# Patient Record
Sex: MALE | Race: WHITE | Employment: UNEMPLOYED | URBAN - METROPOLITAN AREA
[De-identification: names, ages, dates, MRNs, and addresses within clinical notes are randomized per-mention and may not be internally consistent; named-entity substitution may affect disease eponyms.]

---

## 2024-04-03 ENCOUNTER — HOSPITAL ENCOUNTER (INPATIENT)
Facility: HOSPITAL | Age: 29
LOS: 3 days | Discharge: HOME | End: 2024-04-06
Attending: INTERNAL MEDICINE | Admitting: INTERNAL MEDICINE
Payer: MEDICAID

## 2024-04-03 ENCOUNTER — APPOINTMENT (OUTPATIENT)
Facility: HOSPITAL | Age: 29
End: 2024-04-03
Payer: MEDICAID

## 2024-04-03 DIAGNOSIS — J18.9 COMMUNITY ACQUIRED PNEUMONIA OF RIGHT LOWER LOBE OF LUNG: Primary | ICD-10-CM

## 2024-04-03 DIAGNOSIS — R09.02 HYPOXEMIA: ICD-10-CM

## 2024-04-03 DIAGNOSIS — Z86.59 HISTORY OF AUTISM: ICD-10-CM

## 2024-04-03 LAB
ALBUMIN SERPL-MCNC: 3.1 G/DL (ref 3.4–5)
ALBUMIN/GLOB SERPL: 0.8 (ref 0.8–1.7)
ALP SERPL-CCNC: 86 U/L (ref 45–117)
ALT SERPL-CCNC: 64 U/L (ref 16–61)
ANION GAP SERPL CALC-SCNC: 6 MMOL/L (ref 3–18)
AST SERPL-CCNC: 57 U/L (ref 10–38)
BASOPHILS # BLD: 0 K/UL (ref 0–0.1)
BASOPHILS NFR BLD: 0 % (ref 0–2)
BILIRUB SERPL-MCNC: 0.6 MG/DL (ref 0.2–1)
BUN SERPL-MCNC: 11 MG/DL (ref 7–18)
BUN/CREAT SERPL: 11 (ref 12–20)
CALCIUM SERPL-MCNC: 8 MG/DL (ref 8.5–10.1)
CHLORIDE SERPL-SCNC: 108 MMOL/L (ref 100–111)
CO2 SERPL-SCNC: 26 MMOL/L (ref 21–32)
CREAT SERPL-MCNC: 1.01 MG/DL (ref 0.6–1.3)
DIFFERENTIAL METHOD BLD: ABNORMAL
EOSINOPHIL # BLD: 0 K/UL (ref 0–0.4)
EOSINOPHIL NFR BLD: 0 % (ref 0–5)
ERYTHROCYTE [DISTWIDTH] IN BLOOD BY AUTOMATED COUNT: 14.6 % (ref 11.6–14.5)
FLUAV RNA SPEC QL NAA+PROBE: NOT DETECTED
FLUBV RNA SPEC QL NAA+PROBE: NOT DETECTED
GLOBULIN SER CALC-MCNC: 4 G/DL (ref 2–4)
GLUCOSE SERPL-MCNC: 93 MG/DL (ref 74–99)
HCT VFR BLD AUTO: 43 % (ref 36–48)
HGB BLD-MCNC: 14.7 G/DL (ref 13–16)
IMM GRANULOCYTES # BLD AUTO: 0 K/UL (ref 0–0.04)
IMM GRANULOCYTES NFR BLD AUTO: 0 % (ref 0–0.5)
LACTATE SERPL-SCNC: 1 MMOL/L (ref 0.4–2)
LYMPHOCYTES # BLD: 1.8 K/UL (ref 0.9–3.6)
LYMPHOCYTES NFR BLD: 18 % (ref 21–52)
MCH RBC QN AUTO: 33.6 PG (ref 24–34)
MCHC RBC AUTO-ENTMCNC: 34.2 G/DL (ref 31–37)
MCV RBC AUTO: 98.4 FL (ref 78–100)
MONOCYTES # BLD: 0.8 K/UL (ref 0.05–1.2)
MONOCYTES NFR BLD: 8 % (ref 3–10)
NEUTS SEG # BLD: 7.7 K/UL (ref 1.8–8)
NEUTS SEG NFR BLD: 74 % (ref 40–73)
NRBC # BLD: 0 K/UL (ref 0–0.01)
NRBC BLD-RTO: 0 PER 100 WBC
PLATELET # BLD AUTO: 224 K/UL (ref 135–420)
PMV BLD AUTO: 10.2 FL (ref 9.2–11.8)
POTASSIUM SERPL-SCNC: 4.2 MMOL/L (ref 3.5–5.5)
PROT SERPL-MCNC: 7.1 G/DL (ref 6.4–8.2)
RBC # BLD AUTO: 4.37 M/UL (ref 4.35–5.65)
SARS-COV-2 RNA RESP QL NAA+PROBE: NOT DETECTED
SODIUM SERPL-SCNC: 140 MMOL/L (ref 136–145)
WBC # BLD AUTO: 10.4 K/UL (ref 4.6–13.2)

## 2024-04-03 PROCEDURE — 1100000000 HC RM PRIVATE

## 2024-04-03 PROCEDURE — 71045 X-RAY EXAM CHEST 1 VIEW: CPT

## 2024-04-03 PROCEDURE — 87040 BLOOD CULTURE FOR BACTERIA: CPT

## 2024-04-03 PROCEDURE — 2580000003 HC RX 258: Performed by: INTERNAL MEDICINE

## 2024-04-03 PROCEDURE — 6360000002 HC RX W HCPCS: Performed by: INTERNAL MEDICINE

## 2024-04-03 PROCEDURE — 99285 EMERGENCY DEPT VISIT HI MDM: CPT

## 2024-04-03 PROCEDURE — 83605 ASSAY OF LACTIC ACID: CPT

## 2024-04-03 PROCEDURE — 85025 COMPLETE CBC W/AUTO DIFF WBC: CPT

## 2024-04-03 PROCEDURE — 2580000003 HC RX 258: Performed by: PHYSICIAN ASSISTANT

## 2024-04-03 PROCEDURE — 6360000002 HC RX W HCPCS: Performed by: PHYSICIAN ASSISTANT

## 2024-04-03 PROCEDURE — 96365 THER/PROPH/DIAG IV INF INIT: CPT

## 2024-04-03 PROCEDURE — 6370000000 HC RX 637 (ALT 250 FOR IP): Performed by: PHYSICIAN ASSISTANT

## 2024-04-03 PROCEDURE — 80053 COMPREHEN METABOLIC PANEL: CPT

## 2024-04-03 PROCEDURE — 87636 SARSCOV2 & INF A&B AMP PRB: CPT

## 2024-04-03 PROCEDURE — 96375 TX/PRO/DX INJ NEW DRUG ADDON: CPT

## 2024-04-03 RX ORDER — SODIUM CHLORIDE 0.9 % (FLUSH) 0.9 %
5-40 SYRINGE (ML) INJECTION EVERY 12 HOURS SCHEDULED
Status: DISCONTINUED | OUTPATIENT
Start: 2024-04-03 | End: 2024-04-06 | Stop reason: HOSPADM

## 2024-04-03 RX ORDER — ENOXAPARIN SODIUM 100 MG/ML
30 INJECTION SUBCUTANEOUS 2 TIMES DAILY
Status: DISCONTINUED | OUTPATIENT
Start: 2024-04-03 | End: 2024-04-05

## 2024-04-03 RX ORDER — ACETAMINOPHEN 650 MG/1
650 SUPPOSITORY RECTAL EVERY 6 HOURS PRN
Status: DISCONTINUED | OUTPATIENT
Start: 2024-04-03 | End: 2024-04-06 | Stop reason: HOSPADM

## 2024-04-03 RX ORDER — ALBUTEROL SULFATE 90 UG/1
2 AEROSOL, METERED RESPIRATORY (INHALATION) EVERY 6 HOURS PRN
Qty: 18 G | Refills: 0 | Status: SHIPPED | OUTPATIENT
Start: 2024-04-03

## 2024-04-03 RX ORDER — SODIUM CHLORIDE 9 MG/ML
INJECTION, SOLUTION INTRAVENOUS CONTINUOUS
Status: DISPENSED | OUTPATIENT
Start: 2024-04-03 | End: 2024-04-05

## 2024-04-03 RX ORDER — ONDANSETRON 4 MG/1
4 TABLET, ORALLY DISINTEGRATING ORAL EVERY 8 HOURS PRN
Status: DISCONTINUED | OUTPATIENT
Start: 2024-04-03 | End: 2024-04-06 | Stop reason: HOSPADM

## 2024-04-03 RX ORDER — SODIUM CHLORIDE 0.9 % (FLUSH) 0.9 %
5-40 SYRINGE (ML) INJECTION PRN
Status: DISCONTINUED | OUTPATIENT
Start: 2024-04-03 | End: 2024-04-06 | Stop reason: HOSPADM

## 2024-04-03 RX ORDER — ONDANSETRON 2 MG/ML
4 INJECTION INTRAMUSCULAR; INTRAVENOUS EVERY 6 HOURS PRN
Status: DISCONTINUED | OUTPATIENT
Start: 2024-04-03 | End: 2024-04-06 | Stop reason: HOSPADM

## 2024-04-03 RX ORDER — ACETAMINOPHEN 325 MG/1
650 TABLET ORAL EVERY 6 HOURS PRN
Status: DISCONTINUED | OUTPATIENT
Start: 2024-04-03 | End: 2024-04-06 | Stop reason: HOSPADM

## 2024-04-03 RX ORDER — SODIUM CHLORIDE, SODIUM LACTATE, POTASSIUM CHLORIDE, AND CALCIUM CHLORIDE .6; .31; .03; .02 G/100ML; G/100ML; G/100ML; G/100ML
1000 INJECTION, SOLUTION INTRAVENOUS
Status: COMPLETED | OUTPATIENT
Start: 2024-04-03 | End: 2024-04-03

## 2024-04-03 RX ORDER — LORAZEPAM 1 MG/1
2 TABLET ORAL
Status: COMPLETED | OUTPATIENT
Start: 2024-04-03 | End: 2024-04-03

## 2024-04-03 RX ORDER — POLYETHYLENE GLYCOL 3350 17 G/17G
17 POWDER, FOR SOLUTION ORAL DAILY PRN
Status: DISCONTINUED | OUTPATIENT
Start: 2024-04-03 | End: 2024-04-06 | Stop reason: HOSPADM

## 2024-04-03 RX ORDER — DEXAMETHASONE SODIUM PHOSPHATE 10 MG/ML
6 INJECTION, SOLUTION INTRAMUSCULAR; INTRAVENOUS
Status: COMPLETED | OUTPATIENT
Start: 2024-04-03 | End: 2024-04-03

## 2024-04-03 RX ORDER — INHALER, ASSIST DEVICES
SPACER (EA) MISCELLANEOUS
Qty: 1 EACH | Refills: 0 | Status: SHIPPED | OUTPATIENT
Start: 2024-04-03

## 2024-04-03 RX ORDER — AMOXICILLIN AND CLAVULANATE POTASSIUM 875; 125 MG/1; MG/1
1 TABLET, FILM COATED ORAL 2 TIMES DAILY
Qty: 20 TABLET | Refills: 0 | Status: SHIPPED | OUTPATIENT
Start: 2024-04-03 | End: 2024-04-13

## 2024-04-03 RX ORDER — SODIUM CHLORIDE 9 MG/ML
INJECTION, SOLUTION INTRAVENOUS PRN
Status: DISCONTINUED | OUTPATIENT
Start: 2024-04-03 | End: 2024-04-06 | Stop reason: HOSPADM

## 2024-04-03 RX ORDER — METHYLPREDNISOLONE 4 MG/1
TABLET ORAL
Qty: 1 KIT | Refills: 0 | Status: SHIPPED | OUTPATIENT
Start: 2024-04-03

## 2024-04-03 RX ORDER — BENZONATATE 100 MG/1
100 CAPSULE ORAL 3 TIMES DAILY PRN
Qty: 21 CAPSULE | Refills: 0 | Status: SHIPPED | OUTPATIENT
Start: 2024-04-03 | End: 2024-04-10

## 2024-04-03 RX ORDER — AZITHROMYCIN 250 MG/1
250 TABLET, FILM COATED ORAL DAILY
Qty: 4 TABLET | Refills: 0 | Status: SHIPPED | OUTPATIENT
Start: 2024-04-03 | End: 2024-04-07

## 2024-04-03 RX ADMIN — LORAZEPAM 2 MG: 1 TABLET ORAL at 20:07

## 2024-04-03 RX ADMIN — CEFTRIAXONE SODIUM 2000 MG: 2 INJECTION, POWDER, FOR SOLUTION INTRAMUSCULAR; INTRAVENOUS at 20:08

## 2024-04-03 RX ADMIN — SODIUM CHLORIDE, POTASSIUM CHLORIDE, SODIUM LACTATE AND CALCIUM CHLORIDE 1000 ML: 600; 310; 30; 20 INJECTION, SOLUTION INTRAVENOUS at 20:08

## 2024-04-03 RX ADMIN — DEXAMETHASONE SODIUM PHOSPHATE 6 MG: 10 INJECTION, SOLUTION INTRAMUSCULAR; INTRAVENOUS at 22:20

## 2024-04-03 RX ADMIN — ENOXAPARIN SODIUM 30 MG: 100 INJECTION SUBCUTANEOUS at 23:51

## 2024-04-03 RX ADMIN — SODIUM CHLORIDE, PRESERVATIVE FREE 10 ML: 5 INJECTION INTRAVENOUS at 23:57

## 2024-04-03 RX ADMIN — AZITHROMYCIN MONOHYDRATE 500 MG: 500 INJECTION, POWDER, LYOPHILIZED, FOR SOLUTION INTRAVENOUS at 21:09

## 2024-04-03 RX ADMIN — SODIUM CHLORIDE: 9 INJECTION, SOLUTION INTRAVENOUS at 23:52

## 2024-04-03 ASSESSMENT — PAIN - FUNCTIONAL ASSESSMENT: PAIN_FUNCTIONAL_ASSESSMENT: 0-10

## 2024-04-03 ASSESSMENT — LIFESTYLE VARIABLES
HOW OFTEN DO YOU HAVE A DRINK CONTAINING ALCOHOL: NEVER
HOW MANY STANDARD DRINKS CONTAINING ALCOHOL DO YOU HAVE ON A TYPICAL DAY: PATIENT DOES NOT DRINK

## 2024-04-03 ASSESSMENT — PAIN DESCRIPTION - LOCATION: LOCATION: HEAD

## 2024-04-03 NOTE — ED PROVIDER NOTES
[id number]  [phone]             DISCHARGE MEDICATIONS:     Medication List        START taking these medications      albuterol sulfate  (90 Base) MCG/ACT inhaler  Commonly known as: PROVENTIL;VENTOLIN;PROAIR  Inhale 2 puffs into the lungs every 6 hours as needed for Wheezing     amoxicillin-clavulanate 875-125 MG per tablet  Commonly known as: AUGMENTIN  Take 1 tablet by mouth 2 times daily for 10 days     azithromycin 250 MG tablet  Commonly known as: ZITHROMAX  Take 1 tablet by mouth daily for 4 days     benzonatate 100 MG capsule  Commonly known as: TESSALON  Take 1 capsule by mouth 3 times daily as needed for Cough     Compact Space Chamber Catherine  Please provide one adult spacer to be used with HFA     methylPREDNISolone 4 MG tablet  Commonly known as: MEDROL DOSEPACK  Take with food.               Where to Get Your Medications        [id number] [place], [place] - [address] - P [phone] - F [phone] [address] [address]      Phone: [phone]   albuterol sulfate  (90 Base) MCG/ACT inhaler  amoxicillin-clavulanate 875-125 MG per tablet  azithromycin 250 MG tablet  benzonatate 100 MG capsule  Compact Space Chamber Catherine  methylPREDNISolone 4 MG tablet            I am the Primary Clinician of Record.       (Please note that parts of this dictation were completed with voice recognition software. Quite often unanticipated grammatical, syntax, homophones, and other interpretive errors are inadvertently transcribed by the computer software. Please disregards these errors. Please excuse any errors that have escaped final proofreading.)       Anibal Tapia PA-C  04/03/24 [id number]

## 2024-04-04 ENCOUNTER — APPOINTMENT (OUTPATIENT)
Facility: HOSPITAL | Age: 29
End: 2024-04-04
Attending: INTERNAL MEDICINE
Payer: MEDICAID

## 2024-04-04 PROBLEM — F84.0 AUTISM: Status: ACTIVE | Noted: 2024-04-04

## 2024-04-04 PROBLEM — Z87.74: Status: ACTIVE | Noted: 2024-04-04

## 2024-04-04 PROBLEM — Q90.9 DOWN SYNDROME: Status: ACTIVE | Noted: 2024-04-04

## 2024-04-04 PROBLEM — G47.30 SLEEP APNEA: Status: ACTIVE | Noted: 2024-04-04

## 2024-04-04 PROBLEM — E03.9 HYPOTHYROIDISM: Status: ACTIVE | Noted: 2024-04-04

## 2024-04-04 LAB
ALBUMIN SERPL-MCNC: 3 G/DL (ref 3.4–5)
ALBUMIN/GLOB SERPL: 0.7 (ref 0.8–1.7)
ALP SERPL-CCNC: 92 U/L (ref 45–117)
ALT SERPL-CCNC: 67 U/L (ref 16–61)
ANION GAP SERPL CALC-SCNC: 3 MMOL/L (ref 3–18)
AST SERPL-CCNC: 53 U/L (ref 10–38)
BILIRUB SERPL-MCNC: 0.4 MG/DL (ref 0.2–1)
BUN SERPL-MCNC: 10 MG/DL (ref 7–18)
BUN/CREAT SERPL: 9 (ref 12–20)
CALCIUM SERPL-MCNC: 8.2 MG/DL (ref 8.5–10.1)
CHLORIDE SERPL-SCNC: 109 MMOL/L (ref 100–111)
CO2 SERPL-SCNC: 29 MMOL/L (ref 21–32)
CREAT SERPL-MCNC: 1.08 MG/DL (ref 0.6–1.3)
ECHO AO ASC DIAM: 3.5 CM
ECHO AO ASCENDING AORTA INDEX: 1.67 CM/M2
ECHO AO ROOT DIAM: 3.1 CM
ECHO AO ROOT INDEX: 1.48 CM/M2
ECHO AV AREA PEAK VELOCITY: 2.6 CM2
ECHO AV AREA VTI: 2.6 CM2
ECHO AV AREA/BSA PEAK VELOCITY: 1.2 CM2/M2
ECHO AV AREA/BSA VTI: 1.2 CM2/M2
ECHO AV MEAN GRADIENT: 5 MMHG
ECHO AV MEAN VELOCITY: 1 M/S
ECHO AV PEAK GRADIENT: 11 MMHG
ECHO AV PEAK VELOCITY: 1.6 M/S
ECHO AV VELOCITY RATIO: 0.81
ECHO AV VTI: 27 CM
ECHO BSA: 2.23 M2
ECHO EST RA PRESSURE: 3 MMHG
ECHO LA DIAMETER INDEX: 1.86 CM/M2
ECHO LA DIAMETER: 3.9 CM
ECHO LA TO AORTIC ROOT RATIO: 1.26
ECHO LA VOL A-L A2C: 40 ML (ref 18–58)
ECHO LA VOL A-L A4C: 32 ML (ref 18–58)
ECHO LA VOL BP: 35 ML (ref 18–58)
ECHO LA VOL MOD A2C: 39 ML (ref 18–58)
ECHO LA VOL MOD A4C: 30 ML (ref 18–58)
ECHO LA VOL/BSA BIPLANE: 17 ML/M2 (ref 16–34)
ECHO LA VOLUME AREA LENGTH: 37 ML
ECHO LA VOLUME INDEX A-L A2C: 19 ML/M2 (ref 16–34)
ECHO LA VOLUME INDEX A-L A4C: 15 ML/M2 (ref 16–34)
ECHO LA VOLUME INDEX AREA LENGTH: 18 ML/M2 (ref 16–34)
ECHO LA VOLUME INDEX MOD A2C: 19 ML/M2 (ref 16–34)
ECHO LA VOLUME INDEX MOD A4C: 14 ML/M2 (ref 16–34)
ECHO LV E' LATERAL VELOCITY: 21 CM/S
ECHO LV E' SEPTAL VELOCITY: 11 CM/S
ECHO LV EDV A2C: 129 ML
ECHO LV EDV A4C: 83 ML
ECHO LV EDV BP: 104 ML (ref 67–155)
ECHO LV EDV INDEX A4C: 40 ML/M2
ECHO LV EDV INDEX BP: 50 ML/M2
ECHO LV EDV NDEX A2C: 61 ML/M2
ECHO LV EJECTION FRACTION A2C: 68 %
ECHO LV EJECTION FRACTION A4C: 60 %
ECHO LV EJECTION FRACTION BIPLANE: 64 % (ref 55–100)
ECHO LV ESV A2C: 41 ML
ECHO LV ESV A4C: 33 ML
ECHO LV ESV BP: 38 ML (ref 22–58)
ECHO LV ESV INDEX A2C: 20 ML/M2
ECHO LV ESV INDEX A4C: 16 ML/M2
ECHO LV ESV INDEX BP: 18 ML/M2
ECHO LV FRACTIONAL SHORTENING: 37 % (ref 28–44)
ECHO LV INTERNAL DIMENSION DIASTOLE INDEX: 2.48 CM/M2
ECHO LV INTERNAL DIMENSION DIASTOLIC: 5.2 CM (ref 4.2–5.9)
ECHO LV INTERNAL DIMENSION SYSTOLIC INDEX: 1.57 CM/M2
ECHO LV INTERNAL DIMENSION SYSTOLIC: 3.3 CM
ECHO LV IVSD: 0.7 CM (ref 0.6–1)
ECHO LV MASS 2D: 133.8 G (ref 88–224)
ECHO LV MASS INDEX 2D: 63.7 G/M2 (ref 49–115)
ECHO LV POSTERIOR WALL DIASTOLIC: 0.8 CM (ref 0.6–1)
ECHO LV RELATIVE WALL THICKNESS RATIO: 0.31
ECHO LVOT AREA: 3.5 CM2
ECHO LVOT AV VTI INDEX: 0.76
ECHO LVOT DIAM: 2.1 CM
ECHO LVOT MEAN GRADIENT: 3 MMHG
ECHO LVOT PEAK GRADIENT: 6 MMHG
ECHO LVOT PEAK VELOCITY: 1.3 M/S
ECHO LVOT STROKE VOLUME INDEX: 33.8 ML/M2
ECHO LVOT SV: 71 ML
ECHO LVOT VTI: 20.5 CM
ECHO MV A VELOCITY: 0.81 M/S
ECHO MV E DECELERATION TIME (DT): 226 MS
ECHO MV E VELOCITY: 1.07 M/S
ECHO MV E/A RATIO: 1.32
ECHO MV E/E' LATERAL: 5.1
ECHO MV E/E' RATIO (AVERAGED): 7.41
ECHO PULMONARY ARTERY SYSTOLIC PRESSURE (PASP): 29 MMHG
ECHO RA END SYSTOLIC VOLUME APICAL 4 CHAMBER INDEX BSA: 34 ML/M2
ECHO RA VOLUME: 71 ML
ECHO RIGHT VENTRICULAR SYSTOLIC PRESSURE (RVSP): 29 MMHG
ECHO RV TAPSE: 2.3 CM (ref 1.7–?)
ECHO TV REGURGITANT MAX VELOCITY: 2.55 M/S
ECHO TV REGURGITANT PEAK GRADIENT: 26 MMHG
ERYTHROCYTE [DISTWIDTH] IN BLOOD BY AUTOMATED COUNT: 14.6 % (ref 11.6–14.5)
GLOBULIN SER CALC-MCNC: 4.1 G/DL (ref 2–4)
GLUCOSE SERPL-MCNC: 114 MG/DL (ref 74–99)
HCT VFR BLD AUTO: 43.4 % (ref 36–48)
HGB BLD-MCNC: 14.6 G/DL (ref 13–16)
L PNEUMO AG UR QL IA: NEGATIVE
MAGNESIUM SERPL-MCNC: 2.5 MG/DL (ref 1.6–2.6)
MCH RBC QN AUTO: 33.9 PG (ref 24–34)
MCHC RBC AUTO-ENTMCNC: 33.6 G/DL (ref 31–37)
MCV RBC AUTO: 100.7 FL (ref 78–100)
NRBC # BLD: 0 K/UL (ref 0–0.01)
NRBC BLD-RTO: 0 PER 100 WBC
PLATELET # BLD AUTO: 210 K/UL (ref 135–420)
PMV BLD AUTO: 10.3 FL (ref 9.2–11.8)
POTASSIUM SERPL-SCNC: 4 MMOL/L (ref 3.5–5.5)
PROT SERPL-MCNC: 7.1 G/DL (ref 6.4–8.2)
RBC # BLD AUTO: 4.31 M/UL (ref 4.35–5.65)
S PNEUM AG UR QL: NEGATIVE
SODIUM SERPL-SCNC: 141 MMOL/L (ref 136–145)
TROPONIN I SERPL HS-MCNC: 10 NG/L (ref 0–78)
TSH SERPL DL<=0.05 MIU/L-ACNC: 0.97 UIU/ML (ref 0.36–3.74)
WBC # BLD AUTO: 7.5 K/UL (ref 4.6–13.2)

## 2024-04-04 PROCEDURE — 87449 NOS EACH ORGANISM AG IA: CPT

## 2024-04-04 PROCEDURE — 2700000000 HC OXYGEN THERAPY PER DAY

## 2024-04-04 PROCEDURE — 2580000003 HC RX 258: Performed by: INTERNAL MEDICINE

## 2024-04-04 PROCEDURE — 83735 ASSAY OF MAGNESIUM: CPT

## 2024-04-04 PROCEDURE — 6370000000 HC RX 637 (ALT 250 FOR IP): Performed by: INTERNAL MEDICINE

## 2024-04-04 PROCEDURE — 80053 COMPREHEN METABOLIC PANEL: CPT

## 2024-04-04 PROCEDURE — 94640 AIRWAY INHALATION TREATMENT: CPT

## 2024-04-04 PROCEDURE — 93306 TTE W/DOPPLER COMPLETE: CPT

## 2024-04-04 PROCEDURE — 36415 COLL VENOUS BLD VENIPUNCTURE: CPT

## 2024-04-04 PROCEDURE — 1100000000 HC RM PRIVATE

## 2024-04-04 PROCEDURE — 6360000002 HC RX W HCPCS: Performed by: INTERNAL MEDICINE

## 2024-04-04 PROCEDURE — 84484 ASSAY OF TROPONIN QUANT: CPT

## 2024-04-04 PROCEDURE — 85027 COMPLETE CBC AUTOMATED: CPT

## 2024-04-04 PROCEDURE — 84443 ASSAY THYROID STIM HORMONE: CPT

## 2024-04-04 RX ORDER — IPRATROPIUM BROMIDE AND ALBUTEROL SULFATE 2.5; .5 MG/3ML; MG/3ML
1 SOLUTION RESPIRATORY (INHALATION)
Status: DISCONTINUED | OUTPATIENT
Start: 2024-04-04 | End: 2024-04-06 | Stop reason: HOSPADM

## 2024-04-04 RX ORDER — FAMOTIDINE 20 MG/1
10 TABLET, FILM COATED ORAL 2 TIMES DAILY
Status: DISCONTINUED | OUTPATIENT
Start: 2024-04-04 | End: 2024-04-06 | Stop reason: HOSPADM

## 2024-04-04 RX ORDER — LEVOTHYROXINE SODIUM 50 MCG
1 TABLET ORAL EVERY MORNING
COMMUNITY
Start: 2024-03-20

## 2024-04-04 RX ORDER — CLONIDINE HYDROCHLORIDE 0.1 MG/1
0.2 TABLET ORAL NIGHTLY
COMMUNITY

## 2024-04-04 RX ORDER — ARIPIPRAZOLE 2 MG/1
2 TABLET ORAL DAILY
Status: DISCONTINUED | OUTPATIENT
Start: 2024-04-04 | End: 2024-04-06 | Stop reason: HOSPADM

## 2024-04-04 RX ORDER — CLONIDINE HYDROCHLORIDE 0.1 MG/1
0.2 TABLET ORAL NIGHTLY
Status: DISCONTINUED | OUTPATIENT
Start: 2024-04-04 | End: 2024-04-06 | Stop reason: HOSPADM

## 2024-04-04 RX ORDER — GUAIFENESIN 600 MG/1
600 TABLET, EXTENDED RELEASE ORAL 2 TIMES DAILY
Status: DISCONTINUED | OUTPATIENT
Start: 2024-04-04 | End: 2024-04-06 | Stop reason: HOSPADM

## 2024-04-04 RX ORDER — ARIPIPRAZOLE 2 MG/1
2 TABLET ORAL DAILY
COMMUNITY
Start: 2024-03-07

## 2024-04-04 RX ORDER — LEVOTHYROXINE SODIUM 0.05 MG/1
50 TABLET ORAL EVERY MORNING
Status: DISCONTINUED | OUTPATIENT
Start: 2024-04-04 | End: 2024-04-06 | Stop reason: HOSPADM

## 2024-04-04 RX ORDER — FAMOTIDINE 10 MG
10 TABLET ORAL 2 TIMES DAILY
COMMUNITY

## 2024-04-04 RX ORDER — CETIRIZINE HYDROCHLORIDE 10 MG/1
10 TABLET ORAL DAILY
COMMUNITY

## 2024-04-04 RX ORDER — CETIRIZINE HYDROCHLORIDE 10 MG/1
10 TABLET ORAL DAILY
Status: DISCONTINUED | OUTPATIENT
Start: 2024-04-04 | End: 2024-04-06 | Stop reason: HOSPADM

## 2024-04-04 RX ADMIN — ARIPIPRAZOLE 2 MG: 2 TABLET ORAL at 21:46

## 2024-04-04 RX ADMIN — CLONIDINE HYDROCHLORIDE 0.2 MG: 0.1 TABLET ORAL at 21:46

## 2024-04-04 RX ADMIN — AZITHROMYCIN MONOHYDRATE 500 MG: 500 INJECTION, POWDER, LYOPHILIZED, FOR SOLUTION INTRAVENOUS at 21:29

## 2024-04-04 RX ADMIN — IPRATROPIUM BROMIDE AND ALBUTEROL SULFATE 1 DOSE: .5; 3 SOLUTION RESPIRATORY (INHALATION) at 15:30

## 2024-04-04 RX ADMIN — SODIUM CHLORIDE: 9 INJECTION, SOLUTION INTRAVENOUS at 12:50

## 2024-04-04 RX ADMIN — LEVOTHYROXINE SODIUM 50 MCG: 50 TABLET ORAL at 09:34

## 2024-04-04 RX ADMIN — ENOXAPARIN SODIUM 30 MG: 100 INJECTION SUBCUTANEOUS at 21:28

## 2024-04-04 RX ADMIN — ENOXAPARIN SODIUM 30 MG: 100 INJECTION SUBCUTANEOUS at 09:35

## 2024-04-04 RX ADMIN — CEFTRIAXONE 1000 MG: 1 INJECTION, POWDER, FOR SOLUTION INTRAMUSCULAR; INTRAVENOUS at 20:28

## 2024-04-04 RX ADMIN — FAMOTIDINE 10 MG: 20 TABLET, FILM COATED ORAL at 09:34

## 2024-04-04 RX ADMIN — SODIUM CHLORIDE, PRESERVATIVE FREE 10 ML: 5 INJECTION INTRAVENOUS at 21:01

## 2024-04-04 RX ADMIN — GUAIFENESIN 600 MG: 600 TABLET, EXTENDED RELEASE ORAL at 09:34

## 2024-04-04 RX ADMIN — IPRATROPIUM BROMIDE AND ALBUTEROL SULFATE 1 DOSE: .5; 3 SOLUTION RESPIRATORY (INHALATION) at 11:12

## 2024-04-04 RX ADMIN — SODIUM CHLORIDE, PRESERVATIVE FREE 10 ML: 5 INJECTION INTRAVENOUS at 09:36

## 2024-04-04 RX ADMIN — CETIRIZINE HYDROCHLORIDE 10 MG: 10 TABLET, FILM COATED ORAL at 09:34

## 2024-04-04 RX ADMIN — IPRATROPIUM BROMIDE AND ALBUTEROL SULFATE 1 DOSE: .5; 3 SOLUTION RESPIRATORY (INHALATION) at 19:36

## 2024-04-04 RX ADMIN — IPRATROPIUM BROMIDE AND ALBUTEROL SULFATE 1 DOSE: .5; 3 SOLUTION RESPIRATORY (INHALATION) at 07:30

## 2024-04-04 RX ADMIN — FAMOTIDINE 10 MG: 20 TABLET, FILM COATED ORAL at 21:46

## 2024-04-04 RX ADMIN — GUAIFENESIN 600 MG: 600 TABLET, EXTENDED RELEASE ORAL at 21:46

## 2024-04-04 ASSESSMENT — PAIN SCALES - GENERAL: PAINLEVEL_OUTOF10: 0

## 2024-04-04 NOTE — PROGRESS NOTES
Hospitalist Progress Note    Patient: Chris Flores MRN: [id number]  CSN: [id number]    YOB: 1995  Age: 28 y.o.  Sex: male    DOA: 4/3/2024 LOS:  LOS: 1 day                Assessment/Plan     Active Hospital Problems    Diagnosis     Down syndrome [Q90.9]     Hx of atrial septal defect [Z87.74]     Sleep apnea [G47.30]     Hypothyroidism [E03.9]     Autism [F84.0]     BMI 45.0-49.9, adult (HCC) [Z68.42]     Community acquired pneumonia of right lower lobe of lung [J18.9]         Chief complaint :  Cough, SOB, Hypoxia  28 y.o.  male who has history of sleep apnea, hypothyroidism, Down syndrome, autism presents to the emergency room with 5-day history of cough congestion and shortness of breath.     Pneumonia -  RLL  continue on IV Rocephin and Zithromax  COVID 19, influenza A&B negative  Urine Legionella obtained streptococcal antigen negative  He started on DuoNebs     Hypoxemia -  May have a component of obesity hypoventilation syndrome  Wean off oxygen try 6-minute walk test before discharge     Sleep apnea  Using oxygen at night while in the hospital  Wean as tolerated     Down syndrome  His mom will stay with him at bedside avoid anxiolytics     History of atrial ventricular defect  Check echocardiogram for pulmonary hypertension     Hypothyroidism  TSH in normal range  continue Synthroid    BMI of 45    DVT/GI Prophylaxis: Lovenox    Disposition : 2-3 days    Review of systems  General: No fevers or chills.  Cardiovascular: No chest pain or pressure. No palpitations.   Pulmonary: No shortness of breath.   Gastrointestinal: No nausea, vomiting.     Physical Exam:  General: Awake, cooperative,     HEENT: NC, Atraumatic.  PERRLA, anicteric sclerae.  Lungs: Right lower lung crackles.  Heart:  S1 S2,  No murmur, No Rubs, No Gallops  Abdomen: Soft, Non distended, Non tender.  +Bowel sounds,   Extremities: No c/c/e  Psych:   Not anxious or agitated.  Neurologic:  No acute neurological deficit.          Vital signs/Intake and Output:  Visit Vitals  /60   Pulse 86   Temp 99.3 °F (37.4 °C) (Oral)   Resp 20   Ht 1.575 m (5' 2\")   Wt 113.4 kg (250 lb)   SpO2 95%   BMI 45.73 kg/m²     Current Shift:  No intake/output data recorded.  Last three shifts:  04/03 0701 - 04/04 1900  In: 700 [P.O.:700]  Out: 1750 [Urine:1750]            Labs: Results:       Chemistry Recent Labs     04/03/24 1941 04/04/24  0340    141   K 4.2 4.0    109   CO2 26 29   BUN 11 10   GLOB 4.0 4.1*   ,No results found for: \"LACTA\"   CBC w/Diff Recent Labs     04/03/24 1941 04/04/24  0340   WBC 10.4 7.5   RBC 4.37 4.31*   HGB 14.7 14.6   HCT 43.0 43.4    210      Cardiac Enzymes Lab Results   Component Value Date    TROPHS 10 04/04/2024   ,No results found for: \"BNP\"   Coagulation No results for input(s): \"INR\", \"APTT\" in the last 72 hours.    Invalid input(s): \"PTP\"    Lipid Panel No results found for: \"CHOL\", \"CHOLPOCT\", \"CHOLX\", \"CHLST\", \"CHOLV\", \"424628\", \"HDL\", \"HDLC\", \"LDL\", \"LDLC\", \"613587\", \"VLDLC\", \"VLDL\", \"TGLX\", \"TRIGL\"   Pancreas No results for input(s): \"LIPASE\" in the last 72 hours.,No results for input(s): \"AMYLASE\" in the last 72 hours.   Liver Enzymes No results for input(s): \"TP\", \"ALB\" in the last 72 hours.    Invalid input(s): \"TBIL\", \"AP\", \"SGOT\", \"GPT\", \"DBIL\"   Thyroid Studies No results found for: \"T4\", \"T3RU\", \"TSH\"     Procedures/imaging: see electronic medical records for all procedures/Xrays and details which were not copied into this note but were reviewed prior to creation of Plan    TIME: E/M Time spent with patient and patient care issues: [] 31-40 mins  [x] 41-49 mins  [] 50 mins or more.     This time also includes physician non-face-to-face service time visit on the date of service such as  Preparing to see the patient (eg, review of tests)  Obtaining and/or reviewing separately obtained history  Performing a medically necessary appropriate examination and/or evaluation  Counseling

## 2024-04-04 NOTE — PROGRESS NOTES
Bedside and Verbal shift change report given to Maggy RN (oncoming nurse) by Lillian RN (offgoing nurse). Report included the following information Nurse Handoff Report, Adult Overview, Intake/Output, and MAR.

## 2024-04-04 NOTE — H&P
History & Physical    Patient: Chris Flores MRN: [id number]  CSN: [id number]    YOB: 1995  Age: 28 y.o.  Sex: male      DOA: 4/3/2024    Chief Complaint:   Chief Complaint   Patient presents with    Cough    Fever       Active Hospital Problems    Diagnosis Date Noted    Down syndrome [Q90.9] 04/04/2024    Hx of atrial septal defect [Z87.74] 04/04/2024    Sleep apnea [G47.30] 04/04/2024    Hypothyroidism [E03.9] 04/04/2024    Autism [F84.0] 04/04/2024    Community acquired pneumonia of right lower lobe of lung [J18.9] 04/03/2024          HPI:     Chris Flores is a 28 y.o.  male who has history of sleep apnea, hypothyroidism, Down syndrome, autism presents to the emergency room with 5-day history of cough congestion and shortness of breath.  Patient had right-sided pneumonia that was diagnosed but because of the extensiveness of his pneumonia he required lab work he was given 2 mg of Ativan and then became hypoxic.  Fortunately his oxygen levels did not go up afterwards but improved with 2 L of oxygen and asked to admit for pneumonia and hypoxemia.  Patient was diagnosed with sleep apnea as a child but has not used the CPAP machine  He is pretty active and plays [employer] as well as does the [employer] cold plunged challenge  He also goes to  regular    Past Medical History:   Diagnosis Date    ASVD (arteriosclerotic vascular disease)     Down's syndrome     Ear infection        History reviewed. No pertinent surgical history.    History reviewed. No pertinent family history.    Social History     Socioeconomic History    Marital status: Single     Spouse name: None    Number of children: None    Years of education: None    Highest education level: None   Tobacco Use    Smoking status: Never   Substance and Sexual Activity    Alcohol use: Never    Drug use: Never       Prior to Admission medications    Medication Sig Start Date End Date Taking? Authorizing Provider   SYNTHROID 50 MCG  results found for: \"IONCA\"   Magnesium: No results found for: \"MG\"   ABGs: No results found for: \"PHART\", \"PO2ART\", \"ESD3ZIA\", \"VZJ5IVN\", \"BEART\", \"A0TGKTEF\"   Lactic Acid: No results found for: \"LACTA\"   Amylase: No results found for: \"AMYLASE\"   Last 3 Amylase: No results for input(s): \"AMYLASE\" in the last 72 hours.   Lipase: No results found for: \"LIPASE\"   Last 3 Lipase: No results for input(s): \"LIPASE\" in the last 72 hours.   BNP: No results found for: \"BNP\"   Last 3 BNP: No results for input(s): \"BNP\" in the last 72 hours.   Lipids No results found for: \"CHOL\", \"TRIG\", \"HDL\", \"LDLCALC\", \"CHOLHDLRATIO\"  Cardiac Enzymes: No results found for: \"CKTOTAL\", \"CKMB\", \"CKMBINDEX\", \"TROPONINI\"  Urin analysis: No results for input(s): \"COLORU\", \"CLARITYU\", \"SPECGRAV\", \"PHUR\", \"PROTEINU\", \"GLUCOSEU\", \"KETUA\", \"BILIRUBINUR\", \"NITRU\", \"LEUKOCYTESUR\" in the last 72 hours.    Invalid input(s): \"FLOODU\"  Imaging results last 24 hrs :XR CHEST 1 VIEW    Result Date: 4/3/2024  INDICATION: Cough, fever. Portable AP view of the chest. There is no prior study for direct comparison. Cardiomediastinal silhouette is within normal limits. There is right basilar patchy air space consolidation. Lungs are otherwise clear bilaterally. Pleural spaces are normal and there is no pneumothorax. Osseous structures are intact.     Right basilar patchy air space consolidation.     Imaging results impression onlyXR CHEST 1 VIEW    Result Date: 4/3/2024  Right basilar patchy air space consolidation.      XR CHEST 1 VIEW   Final Result   Right basilar patchy air space consolidation.              LAST EKG  No results found for this or any previous visit.      Procedures/imaging: see electronic medical records for all procedures/Xrays and details which were not copied into this note but were reviewed prior to creation of Plan      Assessment/Plan     Principal Problem:    Community acquired pneumonia of right lower lobe of lung  Active Problems:

## 2024-04-04 NOTE — PROGRESS NOTES
6 Minute Walk Test   Pre-Test Vitals:   O2 sat 91% on 2 LNC at rest       Post-Test Vitals:     O2 sat with ambulation 86% on RA     Slowly recovered to 93% after being placed back on 2L NC

## 2024-04-04 NOTE — ED NOTES
Paperwork read with patient making note of where to  prescriptions     IV taken out     Armband removed and disposed of in shredder.     Patient has no further questions at this time.     Will discharge from system.

## 2024-04-04 NOTE — ED NOTES
2L of O2 placed on patient. O2 sats 86% on room air, O2 improvement to 91%-93%. Patient is extremely congested at this time.

## 2024-04-04 NOTE — PROGRESS NOTES
Bedside and Verbal shift change report given to Lillian VILLATORO (oncoming nurse). Report included the following information SBAR, Nurse Handoff Report, Index, Adult Overview, Intake/Output, MAR, and Recent Results.

## 2024-04-04 NOTE — CARE COORDINATION
04/04/24 1550   Service Assessment   Patient Orientation Alert and Oriented;Self   Cognition Other (see comment)  (Patient is diagnosed with Down Syndrome and Autism- Assessment completed with patient's legal guardian)   History Provided By Other (see comment)  (Legal Guardian- Patient's parents)   Primary Caregiver Family   Accompanied By/Relationship Parents - Monica Flores   Support Systems Parent;Family Members   Patient's Healthcare Decision Maker is: Legal Next of Kin   PCP Verified by CM Yes  (Patient's parent were the historians.)   Last Visit to PCP Within last 6 months   Prior Functional Level Shopping;Cooking   Current Functional Level Cooking;Housework;Shopping   Can patient return to prior living arrangement Yes   Ability to make needs known: Unable  (Patient is diagnosed wit Down Syndrome and Autism - Parents are his legal guardians.)   Family able to assist with home care needs: Yes   Would you like for me to discuss the discharge plan with any other family members/significant others, and if so, who? Yes  (Patient's parents - Monica Flores and Chris Flores)   Financial Resources Medicaid   Community Resources Other (Comment)  (D[facility] - Day Support; [facility]; [facility] (Transportation) and \"[facility]\")   Social/Functional History   Lives With Parent   Type of Home House   Home Layout Two level   Discharge Planning   Type of Residence House   Living Arrangements Parent   Current Services Prior To Admission None   Potential Assistance Needed N/A   DME Ordered? No   Potential Assistance Purchasing Medications No   Type of Home Care Services None   Patient expects to be discharged to: House   Follow Up Appointment: Best Day/Time    (unknown)   One/Two Story Residence Two story   History of falls? 0   Services At/After Discharge   Transition of Care Consult (CM Consult) N/A   Services At/After Discharge Community Resources  (Resume community resources)    Resource  Information Provided? No   Mode of Transport at Discharge Other (see comment)  (Patient's father)   Confirm Follow Up Transport Family   Condition of Participation: Discharge Planning   The Plan for Transition of Care is related to the following treatment goals: Patient wants to go home to resume activities.   The Patient and/or Patient Representative was provided with a Choice of Provider? Patient Representative   Name of the Patient Representative who was provided with the Choice of Provider and agrees with the Discharge Plan?  Parents - Monica Flores and Chris Flores   The Patient and/Or Patient Representative agree with the Discharge Plan? Yes   Freedom of Choice list was provided with basic dialogue that supports the patient's individualized plan of care/goals, treatment preferences, and shares the quality data associated with the providers?  No     SW met with family at bedside to complete the discharge planning. Legal guardian were present to complete the assessment. Parents reports that patient participates in day support 5 day/week and [facility]; utilizes [facility] for transport; and receives PT, SP, and RT in-home through [facility]. Family reports that patient does not utilizes any DMEs at home. Parents reports that patient's goal is to get home to resume activities (community established services), and their goal for patient regarding discharge planning is for patient to be 99% better from when he came in the hospital and get his oxygen under control. Parents report upon discharge patient's father would be the mode of transport. SW to continue to follow to assist discharge needs.    ELLEN Estevez  Supervisee in Social Work  Care Management Department

## 2024-04-04 NOTE — PROGRESS NOTES
TRANSFER - IN REPORT:    Verbal report received from Sienna CAMPOS RN on Chris Flores  being received from ED for routine progression of patient care      Report consisted of patient's Situation, Background, Assessment and   Recommendations(SBAR).     Information from the following report(s) Nurse Handoff Report, Index, ED Encounter Summary, ED SBAR, Adult Overview, Intake/Output, MAR, and Recent Results was reviewed with the receiving nurse.    Opportunity for questions and clarification was provided.      Care assumed upon patient's arrival to unit. Patient on O2 via nasal cannula at 2.5L/min, HOB propped up. Med rec completed with mother, other assessment done and documented in flowsheet, concerns addressed, safety measures in place, mother at bedside.

## 2024-04-05 PROCEDURE — 1100000000 HC RM PRIVATE

## 2024-04-05 PROCEDURE — 94640 AIRWAY INHALATION TREATMENT: CPT

## 2024-04-05 PROCEDURE — 97116 GAIT TRAINING THERAPY: CPT

## 2024-04-05 PROCEDURE — 2700000000 HC OXYGEN THERAPY PER DAY

## 2024-04-05 PROCEDURE — 2580000003 HC RX 258: Performed by: INTERNAL MEDICINE

## 2024-04-05 PROCEDURE — 6360000002 HC RX W HCPCS: Performed by: INTERNAL MEDICINE

## 2024-04-05 PROCEDURE — 6370000000 HC RX 637 (ALT 250 FOR IP): Performed by: INTERNAL MEDICINE

## 2024-04-05 PROCEDURE — 97161 PT EVAL LOW COMPLEX 20 MIN: CPT

## 2024-04-05 RX ORDER — ENOXAPARIN SODIUM 100 MG/ML
40 INJECTION SUBCUTANEOUS EVERY 24 HOURS
Status: DISCONTINUED | OUTPATIENT
Start: 2024-04-05 | End: 2024-04-06 | Stop reason: HOSPADM

## 2024-04-05 RX ADMIN — GUAIFENESIN 600 MG: 600 TABLET, EXTENDED RELEASE ORAL at 09:37

## 2024-04-05 RX ADMIN — GUAIFENESIN 600 MG: 600 TABLET, EXTENDED RELEASE ORAL at 20:14

## 2024-04-05 RX ADMIN — IPRATROPIUM BROMIDE AND ALBUTEROL SULFATE 1 DOSE: .5; 3 SOLUTION RESPIRATORY (INHALATION) at 19:30

## 2024-04-05 RX ADMIN — AZITHROMYCIN MONOHYDRATE 500 MG: 500 INJECTION, POWDER, LYOPHILIZED, FOR SOLUTION INTRAVENOUS at 20:15

## 2024-04-05 RX ADMIN — FAMOTIDINE 10 MG: 20 TABLET, FILM COATED ORAL at 20:12

## 2024-04-05 RX ADMIN — CLONIDINE HYDROCHLORIDE 0.2 MG: 0.1 TABLET ORAL at 20:16

## 2024-04-05 RX ADMIN — ACETAMINOPHEN 650 MG: 325 TABLET ORAL at 10:31

## 2024-04-05 RX ADMIN — CETIRIZINE HYDROCHLORIDE 10 MG: 10 TABLET, FILM COATED ORAL at 09:37

## 2024-04-05 RX ADMIN — ARIPIPRAZOLE 2 MG: 2 TABLET ORAL at 20:15

## 2024-04-05 RX ADMIN — IPRATROPIUM BROMIDE AND ALBUTEROL SULFATE 1 DOSE: .5; 3 SOLUTION RESPIRATORY (INHALATION) at 16:14

## 2024-04-05 RX ADMIN — LEVOTHYROXINE SODIUM 50 MCG: 50 TABLET ORAL at 07:40

## 2024-04-05 RX ADMIN — IPRATROPIUM BROMIDE AND ALBUTEROL SULFATE 1 DOSE: .5; 3 SOLUTION RESPIRATORY (INHALATION) at 08:12

## 2024-04-05 RX ADMIN — SODIUM CHLORIDE, PRESERVATIVE FREE 10 ML: 5 INJECTION INTRAVENOUS at 10:37

## 2024-04-05 RX ADMIN — CEFTRIAXONE 1000 MG: 1 INJECTION, POWDER, FOR SOLUTION INTRAMUSCULAR; INTRAVENOUS at 20:14

## 2024-04-05 RX ADMIN — FAMOTIDINE 10 MG: 20 TABLET, FILM COATED ORAL at 09:37

## 2024-04-05 RX ADMIN — IPRATROPIUM BROMIDE AND ALBUTEROL SULFATE 1 DOSE: .5; 3 SOLUTION RESPIRATORY (INHALATION) at 12:12

## 2024-04-05 RX ADMIN — SODIUM CHLORIDE, PRESERVATIVE FREE 10 ML: 5 INJECTION INTRAVENOUS at 20:14

## 2024-04-05 ASSESSMENT — PAIN DESCRIPTION - DESCRIPTORS: DESCRIPTORS: DISCOMFORT

## 2024-04-05 ASSESSMENT — PAIN DESCRIPTION - ORIENTATION: ORIENTATION: RIGHT;LOWER

## 2024-04-05 ASSESSMENT — PAIN DESCRIPTION - LOCATION: LOCATION: ABDOMEN

## 2024-04-05 NOTE — PROGRESS NOTES
Bedside and Verbal shift change report given to ASTRID Nuno (oncoming nurse) by ASTRID Kerr (offgoing nurse). Report included the following information Nurse Handoff Report, Adult Overview, Intake/Output, and MAR.

## 2024-04-05 NOTE — PROGRESS NOTES
Comprehensive Nutrition Assessment      Type and Reason for Visit:  Initial, Positive Nutrition Screen    Nutrition Recommendations/Plan:   Diet as ordered,   Monitor wt for trend,  Recommend Calcium supplement if feasible   Continue to monitor tolerance of PO,  weight, labs (Ca2+), and plan of care during admission.           Nutrition History and Allergies:   Hx of sleep apnea, hypothyroidism, Down syndrome, autism presents to the ER with 5-day history of cough congestion and shortness of breath. Po intake <25% x 5days before admission (only 1 slice toast/d) d/t frequent coughing + diarrhea. Per pt mother pt has loss ~10 lbs over the past week. Follow a low dairy, low sugar, no spicy foods diet at home d/t acid reflux.NKFA.    Nutrition Assessment:       Chris Flores is a 28 y.o. male  admitted on 4/3 for Cough and Fever  Per visit pt is sitting in bed, alert to voice, oriented to self. Pt mother at bedside assist with the assessment.  Significant wt change noticed prior to admission: -3.8 % * 1 week. Yet, wt regaining observed since admission   Current on General diet -  po intake improving.   Meds and Labs reviewed - trending down Ca2+ noted  NFPE indicated risk for malnutrition. See malnutrition assessment for details.     Wt Readings from Last 10 Encounters:   04/04/24 113.4 kg (250 lb)   11/10/23 266.4 lb    Nutrition Related Findings:    Pertinent meds: NS@75ml/hr, levothyroxine, azuthromycin. Pertinent labs: Ca2+ 8, RBC 4.51 (low), .7 (high), H/H - WNL   Wound Type: None    Last BM (including prior to admit): 04/04/24     Current Nutrition Intake & Therapies:    Average Meal Intake: %  Average Supplements Intake: None Ordered  ADULT DIET; Regular     Anthropometric Measures:  Height: 157.5 cm (5' 2\")  Ideal Body Weight (IBW): 118 lbs (54 kg)    Admission Body Weight: 113.4 kg (250 lb) (stated)  Current Body Weight: 117 kg (257 lb 15 oz), 218.6 % IBW. Weight Source: Bed Scale (obtained by ITZEL  minimal items on bed)  Current BMI (kg/m2): 47.2  Usual Body Weight: 117.9 kg (260 lb)  % Weight Change (Calculated): -0.8  Weight Adjustment For: No Adjustment                 BMI Categories: Obese Class 3 (BMI 40.0 or greater)    Estimated Daily Nutrient Needs:  Energy Requirements Based On: Formula  Weight Used for Energy Requirements: Adjusted ((ABW-IBW)X.25 + IBW)  Energy (kcal/day): 1928 ([place] 1.25AF, 1SF)  Weight Used for Protein Requirements: Ideal  Protein (g/day): 65-81 (1.2-1.5 g/kg)  Method Used for Fluid Requirements: ml/Kg  Fluid (ml/day):      Malnutrition Assessment:  Malnutrition Status:  At risk for malnutrition (Comment) (r/t community acquired pneumonia) (04/05/24 1107)    Context:  Acute Illness     Findings of the 6 clinical characteristics of malnutrition:  Energy Intake:  Mild decrease in energy intake (Comment) (poor po intake prior to admission (<25% x 5 days))  Weight Loss:  Greater than 2% over 1 week (-3.8% (~10lb) x 1 week)     Body Fat Loss:  No significant body fat loss     Muscle Mass Loss:  No significant muscle mass loss    Fluid Accumulation:  No significant fluid accumulation     Strength:  Normal  strength    Nutrition Diagnosis:   Predicted inadequate energy intake related to impaired respiratory function as evidenced by poor intake prior to admission, weight loss (po <25% x 5 days and 3.8% (~10lbs) over the past week)      Nutrition Interventions:   Food and/or Nutrient Delivery: Continue Current Diet  Nutrition Education/Counseling: No recommendation at this time  Coordination of Nutrition Care: Continue to monitor while inpatient       Goals:     Goals: Meet at least 75% of estimated needs, PO intake 75% or greater, prior to discharge       Nutrition Monitoring and Evaluation:      Food/Nutrient Intake Outcomes: Food and Nutrient Intake  Physical Signs/Symptoms Outcomes: Biochemical Data, Weight    Discharge Planning:    Continue current diet     Marixa Mora

## 2024-04-05 NOTE — PROGRESS NOTES
0705  Mother refused blood draws at this time.    0740  Bedside and Verbal shift change report given to Usha GRANT RN(oncoming nurse). Report included the following information SBAR, Nurse Handoff Report, Index, Adult Overview, Intake/Output, MAR, and Recent Results.

## 2024-04-05 NOTE — PLAN OF CARE
Problem: Respiratory - Adult  Goal: Achieves optimal ventilation and oxygenation  Flowsheets (Taken 4/5/2024 0147)  Achieves optimal ventilation and oxygenation:   Assess for changes in respiratory status   Assess for changes in mentation and behavior   Position to facilitate oxygenation and minimize respiratory effort   Oxygen supplementation based on oxygen saturation or arterial blood gases   Encourage broncho-pulmonary hygiene including cough, deep breathe, incentive spirometry   Assess the need for suctioning and aspirate as needed   Assess and instruct to report shortness of breath or any respiratory difficulty   Respiratory therapy support as indicated  Outcome: Progressing

## 2024-04-05 NOTE — PROGRESS NOTES
Parent  Type of Home: House  Home Layout: Two level (8+5 steps with landing between and L side HR t/o)  Home Access: Stairs to enter with rails  Entrance Stairs - Number of Steps: 5  Entrance Stairs - Rails: Both  Bathroom Shower/Tub: Tub/Shower unit, Walk-in shower  Bathroom Equipment: None  Home Equipment: None  Receives Help From: Family  ADL Assistance: Independent  Ambulation Assistance: Independent  Transfer Assistance: Independent  Critical Behavior:  Orientation  Orientation Level: Disoriented to person;Oriented to situation  Cognition  Overall Cognitive Status: Exceptions (h/o Down's Syndrome)  Following Commands: Follows one step commands with repetition    Strength:    Strength: Within functional limits (by LE's)    Tone & Sensation:   Tone: Normal     Range Of Motion:  AROM: Within functional limits (by LE's)    Functional Mobility:  Bed Mobility:  Bed Mobility Training  Supine to Sit: Independent;Modified independent  Sit to Supine: Independent;Modified independent  Transfers:   Transfer Training  Sit to Stand: Independent;Supervision (supervision due to equipment)  Stand to Sit: Independent;Supervision (supervision due to equipment)  Balance:   Balance  Sitting: Intact  Standing: Intact  Ambulation/Gait Training:  Gait  Gait Training: Yes  Overall Level of Assistance: Supervision  Distance (ft): 180 Feet  Assistive Device: Gait belt  Base of Support: Widened  Speed/Nadira: Pace decreased (< 100 feet/min)  Gait Abnormalities: Step to gait (to ocassional reciprocal gt)  Pain:  Pain level pre-treatment: NAD no c/o; had 1 episode of abdominal pain while ambulating which appeared to resolve in a few minutes   Pain level post-treatment: NAD no c/o   Pain Intervention(s): Medication (see MAR); Rest, Ice, Repositioning  Response to intervention: Nurse notified     Activity Tolerance:   Activity Tolerance: Patient tolerated evaluation without incident  Please refer to the flowsheet for vital signs taken during

## 2024-04-05 NOTE — PROGRESS NOTES
Hospitalist Progress Note    Patient: Chris Flores MRN: [id number]  CSN: [id number]    YOB: 1995  Age: 28 y.o.  Sex: male    DOA: 4/3/2024 LOS:  LOS: 2 days                Assessment/Plan     Active Hospital Problems    Diagnosis     Down syndrome [Q90.9]     Hx of atrial septal defect [Z87.74]     Sleep apnea [G47.30]     Hypothyroidism [E03.9]     Autism [F84.0]     BMI 45.0-49.9, adult ([facility]) [Z68.42]     Community acquired pneumonia of right lower lobe of lung [J18.9]         Chief complaint :  Cough, SOB, Hypoxia  28 y.o.  male who has history of sleep apnea, hypothyroidism, Down syndrome, autism presents to the emergency room with 5-day history of cough congestion and shortness of breath.     Pneumonia -  RLL  continue on IV Rocephin and Zithromax  COVID 19, influenza A&B negative  Urine Legionella and streptococcal antigen negative     Hypoxemia -  May have a component of obesity hypoventilation syndrome  Wean off oxygen try 6-minute walk test before discharge     Sleep apnea  Using oxygen at night while in the hospital  Wean as tolerated     Down syndrome  His mom will stay with him at bedside avoid anxiolytics     History of atrial ventricular defect  Check echocardiogram for pulmonary hypertension     Hypothyroidism  TSH in normal range  continue Synthroid    BMI of 45    DVT/GI Prophylaxis: Lovenox    Disposition : 2-3 days    Review of systems  General: No fevers or chills.  Cardiovascular: No chest pain or pressure. No palpitations.   Pulmonary: No shortness of breath.   Gastrointestinal: No nausea, vomiting.     Physical Exam:  General: Awake, cooperative,     HEENT: NC, Atraumatic.  PERRLA, anicteric sclerae.  Lungs: Right lower lung crackles.  Heart:  S1 S2,  No murmur, No Rubs, No Gallops  Abdomen: Soft, Non distended, Non tender.  +Bowel sounds,   Extremities: No c/c/e  Psych:   Not anxious or agitated.  Neurologic:  No acute neurological deficit.         Vital signs/Intake and

## 2024-04-05 NOTE — PROGRESS NOTES
0750 Patient's mother stated will continue to refuse laboratory work unless it is completely necessary because she sees patient is agitated.   0950 Ambulate with PT  1649 Checked oxygen before ambulating with PT. Oxygen is 92% on room air   1652 Began ambulating down the tarango, past nurses station, to part of nursing station of 3N. Patient fingers began to become a darker red asked patient if feeling dizzy/ tired. Patient stated yes is tired and wants to return back. Ambulated about 4 - 5 min.   Recheck oxygen at 1657. 90% on room air   1700 - 1703 applied 1L of oxygen and returned to 92%.

## 2024-04-06 VITALS
RESPIRATION RATE: 18 BRPM | TEMPERATURE: 98.1 F | WEIGHT: 250 LBS | HEIGHT: 62 IN | OXYGEN SATURATION: 93 % | SYSTOLIC BLOOD PRESSURE: 125 MMHG | HEART RATE: 74 BPM | DIASTOLIC BLOOD PRESSURE: 82 MMHG | BODY MASS INDEX: 46.01 KG/M2

## 2024-04-06 PROCEDURE — 6370000000 HC RX 637 (ALT 250 FOR IP): Performed by: INTERNAL MEDICINE

## 2024-04-06 PROCEDURE — 94640 AIRWAY INHALATION TREATMENT: CPT

## 2024-04-06 PROCEDURE — 2700000000 HC OXYGEN THERAPY PER DAY

## 2024-04-06 RX ADMIN — FAMOTIDINE 10 MG: 20 TABLET, FILM COATED ORAL at 09:16

## 2024-04-06 RX ADMIN — CETIRIZINE HYDROCHLORIDE 10 MG: 10 TABLET, FILM COATED ORAL at 09:16

## 2024-04-06 RX ADMIN — GUAIFENESIN 600 MG: 600 TABLET, EXTENDED RELEASE ORAL at 09:15

## 2024-04-06 RX ADMIN — IPRATROPIUM BROMIDE AND ALBUTEROL SULFATE 1 DOSE: .5; 3 SOLUTION RESPIRATORY (INHALATION) at 08:19

## 2024-04-06 RX ADMIN — IPRATROPIUM BROMIDE AND ALBUTEROL SULFATE 1 DOSE: .5; 3 SOLUTION RESPIRATORY (INHALATION) at 10:58

## 2024-04-06 RX ADMIN — LEVOTHYROXINE SODIUM 50 MCG: 50 TABLET ORAL at 06:40

## 2024-04-06 NOTE — PROGRESS NOTES
Patient A&O, denies pain or discomfort.  Patient's father at bedside.  Patient refused Lovenox sq.  SCDs applied and patient tolerating.  Lungs with crackles bilaterally and receiving neb treatment.  RT at bedside.  IS to 1000 ml, needs encouragement.      0400 - Mother at bedside and refusing lab draw at this time. No further orders for labs.    0733 -Bedside and Verbal shift change report given to ASTRID Ewing (oncoming nurse) by ASTRID Minaya (offgoing nurse). Report included the following information Nurse Handoff Report, Intake/Output, and MAR.

## 2024-04-06 NOTE — DISCHARGE SUMMARY
Discharge Summary    Patient: Chris Flores MRN: [id number]  CSN: [id number]    YOB: 1995  Age: 28 y.o.  Sex: male    DOA: 4/3/2024 LOS:  LOS: 3 days   Discharge Date:      Primary Care Provider:  Poornima Patel MD    Admission Diagnoses: Hypoxemia [R09.02]  History of autism [Z86.59]  Community acquired pneumonia of right lower lobe of lung [J18.9]    Discharge Diagnoses:    Active Hospital Problems    Diagnosis Date Noted    Down syndrome [Q90.9] 04/04/2024    Hx of atrial septal defect [Z87.74] 04/04/2024    Sleep apnea [G47.30] 04/04/2024    Hypothyroidism [E03.9] 04/04/2024    Autism [F84.0] 04/04/2024    BMI 45.0-49.9, adult (HCC) [Z68.42] 04/04/2024    Community acquired pneumonia of right lower lobe of lung [J18.9] 04/03/2024       Discharge Medications:        Medication List        START taking these medications      albuterol sulfate  (90 Base) MCG/ACT inhaler  Commonly known as: PROVENTIL;VENTOLIN;PROAIR  Inhale 2 puffs into the lungs every 6 hours as needed for Wheezing     amoxicillin-clavulanate 875-125 MG per tablet  Commonly known as: AUGMENTIN  Take 1 tablet by mouth 2 times daily for 10 days     azithromycin 250 MG tablet  Commonly known as: ZITHROMAX  Take 1 tablet by mouth daily for 4 days     benzonatate 100 MG capsule  Commonly known as: TESSALON  Take 1 capsule by mouth 3 times daily as needed for Cough     Compact Space Chamber Catherine  Please provide one adult spacer to be used with HFA     methylPREDNISolone 4 MG tablet  Commonly known as: MEDROL DOSEPACK  Take with food.            CONTINUE taking these medications      ARIPiprazole 2 MG tablet  Commonly known as: ABILIFY     cetirizine 10 MG tablet  Commonly known as: ZYRTEC     cloNIDine 0.1 MG tablet  Commonly known as: CATAPRES     famotidine 10 MG tablet  Commonly known as: PEPCID     Synthroid 50 MCG tablet  Generic drug: levothyroxine               Where to Get Your Medications        These medications  Poornima Patel MD

## 2024-04-07 LAB
BACTERIA SPEC CULT: NORMAL
BACTERIA SPEC CULT: NORMAL
SERVICE CMNT-IMP: NORMAL
SERVICE CMNT-IMP: NORMAL

## 2024-04-08 NOTE — PROGRESS NOTES
Patient discharged in care of parents in stable condition via wheelchair, after all instructions reviewed with him, and his parents. Opportunity for questions provided. They verbalized understanding of all instructions. IV discontinued with catheter tip intact. Escorted to car.

## 2024-10-09 ENCOUNTER — APPOINTMENT (OUTPATIENT)
Facility: HOSPITAL | Age: 29
End: 2024-10-09
Payer: MEDICAID

## 2024-10-09 ENCOUNTER — HOSPITAL ENCOUNTER (INPATIENT)
Facility: HOSPITAL | Age: 29
LOS: 2 days | Discharge: HOME | End: 2024-10-11
Attending: STUDENT IN AN ORGANIZED HEALTH CARE EDUCATION/TRAINING PROGRAM | Admitting: HOSPITALIST
Payer: MEDICAID

## 2024-10-09 DIAGNOSIS — I48.91 ATRIAL FIBRILLATION, UNSPECIFIED TYPE (HCC): Primary | ICD-10-CM

## 2024-10-09 DIAGNOSIS — I48.92 ATRIAL FLUTTER, UNSPECIFIED TYPE (HCC): ICD-10-CM

## 2024-10-09 PROBLEM — J18.9 COMMUNITY ACQUIRED PNEUMONIA OF RIGHT LOWER LOBE OF LUNG: Status: RESOLVED | Noted: 2024-04-03 | Resolved: 2024-10-09

## 2024-10-09 LAB
ALBUMIN SERPL-MCNC: 2.8 G/DL (ref 3.4–5)
ALBUMIN/GLOB SERPL: 0.7 (ref 0.8–1.7)
ALP SERPL-CCNC: 143 U/L (ref 45–117)
ALT SERPL-CCNC: 87 U/L (ref 16–61)
ANION GAP SERPL CALC-SCNC: 5 MMOL/L (ref 3–18)
APPEARANCE UR: CLEAR
AST SERPL-CCNC: 39 U/L (ref 10–38)
BASOPHILS # BLD: 0.1 K/UL (ref 0–0.1)
BASOPHILS NFR BLD: 1 % (ref 0–2)
BILIRUB SERPL-MCNC: 0.5 MG/DL (ref 0.2–1)
BILIRUB UR QL: NEGATIVE
BUN SERPL-MCNC: 16 MG/DL (ref 7–18)
BUN/CREAT SERPL: 15 (ref 12–20)
CALCIUM SERPL-MCNC: 8.7 MG/DL (ref 8.5–10.1)
CHLORIDE SERPL-SCNC: 106 MMOL/L (ref 100–111)
CO2 SERPL-SCNC: 28 MMOL/L (ref 21–32)
COLOR UR: YELLOW
CREAT SERPL-MCNC: 1.06 MG/DL (ref 0.6–1.3)
D DIMER PPP FEU-MCNC: 1.55 UG/ML(FEU)
DIFFERENTIAL METHOD BLD: ABNORMAL
EOSINOPHIL # BLD: 0.1 K/UL (ref 0–0.4)
EOSINOPHIL NFR BLD: 1 % (ref 0–5)
ERYTHROCYTE [DISTWIDTH] IN BLOOD BY AUTOMATED COUNT: 14.6 % (ref 11.6–14.5)
FLUAV RNA SPEC QL NAA+PROBE: NOT DETECTED
FLUBV RNA SPEC QL NAA+PROBE: NOT DETECTED
GLOBULIN SER CALC-MCNC: 4.2 G/DL (ref 2–4)
GLUCOSE SERPL-MCNC: 90 MG/DL (ref 74–99)
GLUCOSE UR STRIP.AUTO-MCNC: NEGATIVE MG/DL
HCT VFR BLD AUTO: 42.7 % (ref 36–48)
HGB BLD-MCNC: 14.7 G/DL (ref 13–16)
HGB UR QL STRIP: NEGATIVE
IMM GRANULOCYTES # BLD AUTO: 0.2 K/UL (ref 0–0.04)
IMM GRANULOCYTES NFR BLD AUTO: 2 % (ref 0–0.5)
KETONES UR QL STRIP.AUTO: NEGATIVE MG/DL
LEUKOCYTE ESTERASE UR QL STRIP.AUTO: NEGATIVE
LYMPHOCYTES # BLD: 2.6 K/UL (ref 0.9–3.6)
LYMPHOCYTES NFR BLD: 24 % (ref 21–52)
MAGNESIUM SERPL-MCNC: 2.3 MG/DL (ref 1.6–2.6)
MCH RBC QN AUTO: 33.9 PG (ref 24–34)
MCHC RBC AUTO-ENTMCNC: 34.4 G/DL (ref 31–37)
MCV RBC AUTO: 98.4 FL (ref 78–100)
MONOCYTES # BLD: 1.3 K/UL (ref 0.05–1.2)
MONOCYTES NFR BLD: 12 % (ref 3–10)
NEUTS SEG # BLD: 6.6 K/UL (ref 1.8–8)
NEUTS SEG NFR BLD: 61 % (ref 40–73)
NITRITE UR QL STRIP.AUTO: NEGATIVE
NRBC # BLD: 0 K/UL (ref 0–0.01)
NRBC BLD-RTO: 0 PER 100 WBC
PH UR STRIP: 8 (ref 5–8)
PHOSPHATE SERPL-MCNC: 2.2 MG/DL (ref 2.5–4.9)
PLATELET # BLD AUTO: 328 K/UL (ref 135–420)
PMV BLD AUTO: 10 FL (ref 9.2–11.8)
POTASSIUM SERPL-SCNC: 4 MMOL/L (ref 3.5–5.5)
PROT SERPL-MCNC: 7 G/DL (ref 6.4–8.2)
PROT UR STRIP-MCNC: NEGATIVE MG/DL
RBC # BLD AUTO: 4.34 M/UL (ref 4.35–5.65)
SARS-COV-2 RNA RESP QL NAA+PROBE: NOT DETECTED
SODIUM SERPL-SCNC: 139 MMOL/L (ref 136–145)
SOURCE: NORMAL
SP GR UR REFRACTOMETRY: 1.02 (ref 1–1.03)
T4 FREE SERPL-MCNC: 1.5 NG/DL (ref 0.7–1.5)
TROPONIN I SERPL HS-MCNC: 35 NG/L (ref 0–78)
TROPONIN I SERPL HS-MCNC: 37 NG/L (ref 0–78)
TSH SERPL DL<=0.05 MIU/L-ACNC: 3.75 UIU/ML (ref 0.36–3.74)
UROBILINOGEN UR QL STRIP.AUTO: 0.2 EU/DL (ref 0.2–1)
WBC # BLD AUTO: 10.9 K/UL (ref 4.6–13.2)

## 2024-10-09 PROCEDURE — 2580000003 HC RX 258: Performed by: EMERGENCY MEDICINE

## 2024-10-09 PROCEDURE — 96366 THER/PROPH/DIAG IV INF ADDON: CPT

## 2024-10-09 PROCEDURE — 2580000003 HC RX 258: Performed by: STUDENT IN AN ORGANIZED HEALTH CARE EDUCATION/TRAINING PROGRAM

## 2024-10-09 PROCEDURE — 84484 ASSAY OF TROPONIN QUANT: CPT

## 2024-10-09 PROCEDURE — 71045 X-RAY EXAM CHEST 1 VIEW: CPT

## 2024-10-09 PROCEDURE — 84443 ASSAY THYROID STIM HORMONE: CPT

## 2024-10-09 PROCEDURE — 85379 FIBRIN DEGRADATION QUANT: CPT

## 2024-10-09 PROCEDURE — 99285 EMERGENCY DEPT VISIT HI MDM: CPT

## 2024-10-09 PROCEDURE — 96365 THER/PROPH/DIAG IV INF INIT: CPT

## 2024-10-09 PROCEDURE — 2000000000 HC ICU R&B

## 2024-10-09 PROCEDURE — 2500000003 HC RX 250 WO HCPCS: Performed by: EMERGENCY MEDICINE

## 2024-10-09 PROCEDURE — 6360000002 HC RX W HCPCS: Performed by: STUDENT IN AN ORGANIZED HEALTH CARE EDUCATION/TRAINING PROGRAM

## 2024-10-09 PROCEDURE — 6360000004 HC RX CONTRAST MEDICATION: Performed by: STUDENT IN AN ORGANIZED HEALTH CARE EDUCATION/TRAINING PROGRAM

## 2024-10-09 PROCEDURE — 96375 TX/PRO/DX INJ NEW DRUG ADDON: CPT

## 2024-10-09 PROCEDURE — 81003 URINALYSIS AUTO W/O SCOPE: CPT

## 2024-10-09 PROCEDURE — 84439 ASSAY OF FREE THYROXINE: CPT

## 2024-10-09 PROCEDURE — 85025 COMPLETE CBC W/AUTO DIFF WBC: CPT

## 2024-10-09 PROCEDURE — 6370000000 HC RX 637 (ALT 250 FOR IP): Performed by: HOSPITALIST

## 2024-10-09 PROCEDURE — 36415 COLL VENOUS BLD VENIPUNCTURE: CPT

## 2024-10-09 PROCEDURE — 80053 COMPREHEN METABOLIC PANEL: CPT

## 2024-10-09 PROCEDURE — 2580000003 HC RX 258: Performed by: HOSPITALIST

## 2024-10-09 PROCEDURE — 93005 ELECTROCARDIOGRAM TRACING: CPT | Performed by: STUDENT IN AN ORGANIZED HEALTH CARE EDUCATION/TRAINING PROGRAM

## 2024-10-09 PROCEDURE — 87636 SARSCOV2 & INF A&B AMP PRB: CPT

## 2024-10-09 PROCEDURE — 6370000000 HC RX 637 (ALT 250 FOR IP): Performed by: STUDENT IN AN ORGANIZED HEALTH CARE EDUCATION/TRAINING PROGRAM

## 2024-10-09 PROCEDURE — 6370000000 HC RX 637 (ALT 250 FOR IP): Performed by: INTERNAL MEDICINE

## 2024-10-09 PROCEDURE — 87086 URINE CULTURE/COLONY COUNT: CPT

## 2024-10-09 PROCEDURE — 83735 ASSAY OF MAGNESIUM: CPT

## 2024-10-09 PROCEDURE — 71275 CT ANGIOGRAPHY CHEST: CPT

## 2024-10-09 PROCEDURE — 96361 HYDRATE IV INFUSION ADD-ON: CPT

## 2024-10-09 PROCEDURE — 2500000003 HC RX 250 WO HCPCS: Performed by: STUDENT IN AN ORGANIZED HEALTH CARE EDUCATION/TRAINING PROGRAM

## 2024-10-09 PROCEDURE — 87040 BLOOD CULTURE FOR BACTERIA: CPT

## 2024-10-09 PROCEDURE — 84100 ASSAY OF PHOSPHORUS: CPT

## 2024-10-09 PROCEDURE — 96376 TX/PRO/DX INJ SAME DRUG ADON: CPT

## 2024-10-09 RX ORDER — ARIPIPRAZOLE 2 MG/1
2 TABLET ORAL DAILY
Status: DISCONTINUED | OUTPATIENT
Start: 2024-10-09 | End: 2024-10-10

## 2024-10-09 RX ORDER — ENOXAPARIN SODIUM 100 MG/ML
30 INJECTION SUBCUTANEOUS 2 TIMES DAILY
Status: DISCONTINUED | OUTPATIENT
Start: 2024-10-09 | End: 2024-10-11 | Stop reason: HOSPADM

## 2024-10-09 RX ORDER — ACETAMINOPHEN 325 MG/1
650 TABLET ORAL EVERY 6 HOURS PRN
Status: DISCONTINUED | OUTPATIENT
Start: 2024-10-09 | End: 2024-10-11 | Stop reason: HOSPADM

## 2024-10-09 RX ORDER — MAGNESIUM SULFATE IN WATER 40 MG/ML
2000 INJECTION, SOLUTION INTRAVENOUS PRN
Status: DISCONTINUED | OUTPATIENT
Start: 2024-10-09 | End: 2024-10-11 | Stop reason: HOSPADM

## 2024-10-09 RX ORDER — POTASSIUM CHLORIDE 7.45 MG/ML
10 INJECTION INTRAVENOUS PRN
Status: DISCONTINUED | OUTPATIENT
Start: 2024-10-09 | End: 2024-10-11 | Stop reason: HOSPADM

## 2024-10-09 RX ORDER — MECOBALAMIN 5000 MCG
10 TABLET,DISINTEGRATING ORAL NIGHTLY
Status: DISCONTINUED | OUTPATIENT
Start: 2024-10-09 | End: 2024-10-11 | Stop reason: HOSPADM

## 2024-10-09 RX ORDER — DILTIAZEM HYDROCHLORIDE 120 MG/1
120 CAPSULE, COATED, EXTENDED RELEASE ORAL DAILY
Qty: 30 CAPSULE | Refills: 0 | Status: SHIPPED | OUTPATIENT
Start: 2024-10-09 | End: 2024-11-08

## 2024-10-09 RX ORDER — POTASSIUM CHLORIDE 29.8 MG/ML
20 INJECTION INTRAVENOUS PRN
Status: DISCONTINUED | OUTPATIENT
Start: 2024-10-09 | End: 2024-10-11 | Stop reason: HOSPADM

## 2024-10-09 RX ORDER — IOPAMIDOL 755 MG/ML
100 INJECTION, SOLUTION INTRAVASCULAR
Status: COMPLETED | OUTPATIENT
Start: 2024-10-09 | End: 2024-10-09

## 2024-10-09 RX ORDER — DILTIAZEM HYDROCHLORIDE 120 MG/1
120 CAPSULE, COATED, EXTENDED RELEASE ORAL
Status: COMPLETED | OUTPATIENT
Start: 2024-10-09 | End: 2024-10-09

## 2024-10-09 RX ORDER — SODIUM CHLORIDE, SODIUM LACTATE, POTASSIUM CHLORIDE, AND CALCIUM CHLORIDE .6; .31; .03; .02 G/100ML; G/100ML; G/100ML; G/100ML
1000 INJECTION, SOLUTION INTRAVENOUS ONCE
Status: COMPLETED | OUTPATIENT
Start: 2024-10-09 | End: 2024-10-09

## 2024-10-09 RX ORDER — SODIUM CHLORIDE 9 MG/ML
INJECTION, SOLUTION INTRAVENOUS PRN
Status: DISCONTINUED | OUTPATIENT
Start: 2024-10-09 | End: 2024-10-11 | Stop reason: HOSPADM

## 2024-10-09 RX ORDER — DILTIAZEM HYDROCHLORIDE 5 MG/ML
0.25 INJECTION INTRAVENOUS
Status: COMPLETED | OUTPATIENT
Start: 2024-10-09 | End: 2024-10-09

## 2024-10-09 RX ORDER — CLONIDINE HYDROCHLORIDE 0.1 MG/1
0.2 TABLET ORAL NIGHTLY
Status: DISCONTINUED | OUTPATIENT
Start: 2024-10-09 | End: 2024-10-09

## 2024-10-09 RX ORDER — POLYETHYLENE GLYCOL 3350 17 G/17G
17 POWDER, FOR SOLUTION ORAL DAILY PRN
Status: DISCONTINUED | OUTPATIENT
Start: 2024-10-09 | End: 2024-10-11 | Stop reason: HOSPADM

## 2024-10-09 RX ORDER — PANTOPRAZOLE SODIUM 40 MG/1
40 TABLET, DELAYED RELEASE ORAL
Status: DISCONTINUED | OUTPATIENT
Start: 2024-10-10 | End: 2024-10-11 | Stop reason: HOSPADM

## 2024-10-09 RX ORDER — ACETAMINOPHEN 650 MG/1
650 SUPPOSITORY RECTAL EVERY 6 HOURS PRN
Status: DISCONTINUED | OUTPATIENT
Start: 2024-10-09 | End: 2024-10-11 | Stop reason: HOSPADM

## 2024-10-09 RX ORDER — SODIUM CHLORIDE, SODIUM LACTATE, POTASSIUM CHLORIDE, AND CALCIUM CHLORIDE .6; .31; .03; .02 G/100ML; G/100ML; G/100ML; G/100ML
2000 INJECTION, SOLUTION INTRAVENOUS ONCE
Status: COMPLETED | OUTPATIENT
Start: 2024-10-09 | End: 2024-10-09

## 2024-10-09 RX ORDER — ONDANSETRON 2 MG/ML
4 INJECTION INTRAMUSCULAR; INTRAVENOUS EVERY 6 HOURS PRN
Status: DISCONTINUED | OUTPATIENT
Start: 2024-10-09 | End: 2024-10-11 | Stop reason: HOSPADM

## 2024-10-09 RX ORDER — ONDANSETRON 4 MG/1
4 TABLET, ORALLY DISINTEGRATING ORAL EVERY 8 HOURS PRN
Status: DISCONTINUED | OUTPATIENT
Start: 2024-10-09 | End: 2024-10-11 | Stop reason: HOSPADM

## 2024-10-09 RX ORDER — SODIUM CHLORIDE 0.9 % (FLUSH) 0.9 %
5-40 SYRINGE (ML) INJECTION EVERY 12 HOURS SCHEDULED
Status: DISCONTINUED | OUTPATIENT
Start: 2024-10-09 | End: 2024-10-11 | Stop reason: HOSPADM

## 2024-10-09 RX ORDER — LEVOTHYROXINE SODIUM 50 UG/1
50 TABLET ORAL EVERY MORNING
Status: DISCONTINUED | OUTPATIENT
Start: 2024-10-10 | End: 2024-10-11 | Stop reason: HOSPADM

## 2024-10-09 RX ORDER — CLONIDINE HYDROCHLORIDE 0.1 MG/1
0.2 TABLET ORAL NIGHTLY
Status: DISCONTINUED | OUTPATIENT
Start: 2024-10-09 | End: 2024-10-11 | Stop reason: HOSPADM

## 2024-10-09 RX ORDER — SODIUM CHLORIDE 0.9 % (FLUSH) 0.9 %
5-40 SYRINGE (ML) INJECTION PRN
Status: DISCONTINUED | OUTPATIENT
Start: 2024-10-09 | End: 2024-10-11 | Stop reason: HOSPADM

## 2024-10-09 RX ADMIN — DILTIAZEM HYDROCHLORIDE 120 MG: 120 CAPSULE, EXTENDED RELEASE ORAL at 15:35

## 2024-10-09 RX ADMIN — CLONIDINE HYDROCHLORIDE 0.2 MG: 0.1 TABLET ORAL at 22:12

## 2024-10-09 RX ADMIN — SODIUM CHLORIDE, POTASSIUM CHLORIDE, SODIUM LACTATE AND CALCIUM CHLORIDE 1000 ML: 600; 310; 30; 20 INJECTION, SOLUTION INTRAVENOUS at 13:46

## 2024-10-09 RX ADMIN — PIPERACILLIN AND TAZOBACTAM 3375 MG: 3; .375 INJECTION, POWDER, LYOPHILIZED, FOR SOLUTION INTRAVENOUS at 14:14

## 2024-10-09 RX ADMIN — DILTIAZEM HYDROCHLORIDE 2.5 MG/HR: 5 INJECTION, SOLUTION INTRAVENOUS at 17:32

## 2024-10-09 RX ADMIN — SODIUM CHLORIDE, PRESERVATIVE FREE 10 ML: 5 INJECTION INTRAVENOUS at 22:11

## 2024-10-09 RX ADMIN — Medication 10 MG: at 22:12

## 2024-10-09 RX ADMIN — SODIUM CHLORIDE, POTASSIUM CHLORIDE, SODIUM LACTATE AND CALCIUM CHLORIDE 2000 ML: 600; 310; 30; 20 INJECTION, SOLUTION INTRAVENOUS at 16:07

## 2024-10-09 RX ADMIN — DILTIAZEM HYDROCHLORIDE 28 MG: 5 INJECTION, SOLUTION INTRAVENOUS at 13:55

## 2024-10-09 RX ADMIN — POTASSIUM & SODIUM PHOSPHATES POWDER PACK 280-160-250 MG 500 MG: 280-160-250 PACK at 16:01

## 2024-10-09 RX ADMIN — IOPAMIDOL 100 ML: 755 INJECTION, SOLUTION INTRAVENOUS at 14:29

## 2024-10-09 ASSESSMENT — PAIN - FUNCTIONAL ASSESSMENT: PAIN_FUNCTIONAL_ASSESSMENT: NONE - DENIES PAIN

## 2024-10-09 NOTE — ED NOTES
TRANSFER - OUT REPORT:    Verbal report given to ASTRID Rodríguez on Chris Flores  being transferred to ICU for routine progression of patient care       Report consisted of patient's Situation, Background, Assessment and   Recommendations(SBAR).     Information from the following report(s) Nurse Handoff Report, Index, ED Encounter Summary, ED SBAR, MAR, and Recent Results was reviewed with the receiving nurse.    Birney Fall Assessment:                           Lines:   Peripheral IV Distal;Right;Anterior Antecubital (Active)        Opportunity for questions and clarification was provided.      Patient transported with:  Registered Nurse

## 2024-10-09 NOTE — ED NOTES
I assumed care of this patient from Dr. Rivers.  Patient with a history of Down syndrome, had cardiac surgery as an infant, had COVID about a week or 2 ago causing him to have nausea vomiting and family had to stop some of his meds medications, thyroid medication, clonidine and hydroxyzine.  Patient seen PCPs office and sent to ED due to tachycardia.  Patient initially worked up for sepsis and results negative however he was also noted to have new onset A-fib a flutter and was given 1 dose IV Cardizem.  Blood pressure was soft, his heart rate dropped to normal but he continued to be in A-fib.  I was asked to monitor patient and also review third troponin.  I saw patient with asymptomatic.  He appears in no distress.  With my initial exam heart rate was in the 80s.  I observed the patient for about an hour and noted heart rate increasing to about 800-215.  Started patient on a 2.5 mg/h Cardizem.  Consulted Dr. Davalos again who agreed with admission and will follow consult.  Consulted with hospitalist, Dr. Keys and will admit patient to ICU.  Consulted with intensivist Dr. Griffith who accepted patient to ICU.    CRITICAL CARE Documentation:    This patient is critically ill and there is a high probability of of imminent or life threatening deterioration in the patient's condition without immediate management.    The nature of the patient's clinical problem is: New Onset Afib/flutter    I have spent 30 min time in direct patient care, documentation, review of labs/xrays/old records, discussion with Family, Nursing, cardiology and hospitalist consults  .     The time involved in the performance of separately reportable procedures was not counted toward critical care time.     Marcus Ellison MD; 10/9/2024 @5:43 PM            Marcus Ellison MD  10/09/24 1745       Marcus Ellison MD  10/09/24 8176

## 2024-10-09 NOTE — ED TRIAGE NOTES
Pt ambulatory with mother c/o tachycardia. Pt mother reports he was previously seen in Dr office and hr was in 160s. Pt mother reports pt was previously positive for covid 2 weeks ago but has since had ongoing cough.

## 2024-10-09 NOTE — ED PROVIDER NOTES
[facility] EMERGENCY DEPT  EMERGENCY DEPARTMENT ENCOUNTER    Patient Name: Chris Flores  MRN: [id number]  YOB: 1995  Provider: Jose Juan Rivers MD  PCP: Poornima Patel MD   Time/Date of evaluation: 1:47 PM EDT on 10/9/24    History of Presenting Illness     Chief Complaint   Patient presents with    Tachycardia     Pt goes by \"yazmin\". Downs syndrome, needs quiet, slow environment, and explanation before doing anything. Pt with covid 2 weeks ago, seen in office 2 days with cough, hypoxia (93%), put on augmentin, recheck today with improved cough, 02 sat 96%, but hr 160, appears ST on EKG. Sending for further eval, r/o PE. Poor historian, coming with mother.       History Provided By: Patient, Patient's Father, and Patient's Mother     History (Narative):   Chris Flores is a 28 y.o. male with a PMH significant for Down Syndrome with developmental delay, ASD/VSD s/p repair remotely who presents to the emergency department with rapid heart rate. The patient is conversant but is unable to provide much history. History reported by mother is that the patient was diagnosed with COVID-19 approximately 2 weeks ago. Since then he has persisted with a cough that has become productive and fevers. He was seen by his PCP 2-3 days ago and placed on Abx for presumed PNA, but no CXR was completed. The patient was seen earlier today and noted to have a HR in the 170s. He was sent to the ED for further care.    The patient denies chest pain, SOB, or palpations. He reports that he feels well. Reports ongoing cough but no subjective fevers or chills. No nausea, vomiting, diarrhea.     Nursing Notes were all reviewed and agreed with or any disagreements were addressed in the HPI.    Past History     Past Medical History:  Past Medical History:   Diagnosis Date    ASVD (arteriosclerotic vascular disease)     Down's syndrome     Ear infection        Past Surgical History:  History reviewed. No pertinent surgical

## 2024-10-09 NOTE — CONSULTS
[facility] Consult Note      Patient: Chris Flores MRN: [id number]  SSN: [id number]    YOB: 1995  Age: 28 y.o.  Sex: male    Date of Consultation: 10/9/2024    Reason for Consultation: Atrial fibrillation with RVR    HPI:  I was asked by Dr. Ellison to see this pleasant patient for  for atrial fibrillation with RVR.  Chris Flores is a 28-year-old pleasant gentleman with history of Down syndrome, thyroid disorder, obesity, sleep apnea and history of ASD and VSD repair in patients infancy age.  Patient also have just recently recovered from upper respiratory tract infection and COVID infection.  Patient was transferred to ER by his PMD due to Afib with RVR.    Patient denied any symptoms of chest pain, shortness of breath, palpitation, dizziness, presyncope and syncope.                 Past Medical History:   Diagnosis Date    ASVD (arteriosclerotic vascular disease)     Down's syndrome     Ear infection      History reviewed. No pertinent surgical history.  Current Facility-Administered Medications   Medication Dose Route Frequency    dilTIAZem 125 mg in sodium chloride 0.9 % 125 mL infusion  2.5 mg/hr IntraVENous Continuous    ARIPiprazole (ABILIFY) tablet 2 mg  2 mg Oral Daily    [START ON 10/10/2024] levothyroxine (SYNTHROID) tablet 50 mcg  50 mcg Oral QAM    sodium chloride flush 0.9 % injection 5-40 mL  5-40 mL IntraVENous 2 times per day    sodium chloride flush 0.9 % injection 5-40 mL  5-40 mL IntraVENous PRN    0.9 % sodium chloride infusion   IntraVENous PRN    potassium chloride 20 mEq/50 mL IVPB (Central Line)  20 mEq IntraVENous PRN    Or    potassium chloride 10 mEq/100 mL IVPB (Peripheral Line)  10 mEq IntraVENous PRN    magnesium sulfate 2000 mg in 50 mL IVPB premix  2,000 mg IntraVENous PRN    enoxaparin Sodium (LOVENOX) injection 30 mg  30 mg SubCUTAneous BID    ondansetron (ZOFRAN-ODT) disintegrating tablet 4 mg  4 mg Oral Q8H PRN    Or    ondansetron (ZOFRAN) injection 4 mg  4 mg

## 2024-10-10 ENCOUNTER — APPOINTMENT (OUTPATIENT)
Facility: HOSPITAL | Age: 29
End: 2024-10-10
Attending: HOSPITALIST
Payer: MEDICAID

## 2024-10-10 LAB
ALBUMIN SERPL-MCNC: 2.1 G/DL (ref 3.4–5)
ALBUMIN/GLOB SERPL: 0.5 (ref 0.8–1.7)
ALP SERPL-CCNC: 114 U/L (ref 45–117)
ALT SERPL-CCNC: 67 U/L (ref 16–61)
ANION GAP SERPL CALC-SCNC: 4 MMOL/L (ref 3–18)
AST SERPL-CCNC: 34 U/L (ref 10–38)
BASOPHILS # BLD: 0.1 K/UL (ref 0–0.1)
BASOPHILS NFR BLD: 1 % (ref 0–2)
BILIRUB SERPL-MCNC: 0.3 MG/DL (ref 0.2–1)
BUN SERPL-MCNC: 17 MG/DL (ref 7–18)
BUN/CREAT SERPL: 17 (ref 12–20)
CALCIUM SERPL-MCNC: 8.1 MG/DL (ref 8.5–10.1)
CHLORIDE SERPL-SCNC: 109 MMOL/L (ref 100–111)
CO2 SERPL-SCNC: 28 MMOL/L (ref 21–32)
CREAT SERPL-MCNC: 0.99 MG/DL (ref 0.6–1.3)
DIFFERENTIAL METHOD BLD: ABNORMAL
EOSINOPHIL # BLD: 0.2 K/UL (ref 0–0.4)
EOSINOPHIL NFR BLD: 2 % (ref 0–5)
ERYTHROCYTE [DISTWIDTH] IN BLOOD BY AUTOMATED COUNT: 14.7 % (ref 11.6–14.5)
GLOBULIN SER CALC-MCNC: 4.1 G/DL (ref 2–4)
GLUCOSE SERPL-MCNC: 101 MG/DL (ref 74–99)
HCT VFR BLD AUTO: 36.7 % (ref 36–48)
HGB BLD-MCNC: 12.4 G/DL (ref 13–16)
IMM GRANULOCYTES # BLD AUTO: 0.2 K/UL (ref 0–0.04)
IMM GRANULOCYTES NFR BLD AUTO: 2 % (ref 0–0.5)
LYMPHOCYTES # BLD: 2.2 K/UL (ref 0.9–3.6)
LYMPHOCYTES NFR BLD: 25 % (ref 21–52)
MAGNESIUM SERPL-MCNC: 2.1 MG/DL (ref 1.6–2.6)
MCH RBC QN AUTO: 33.7 PG (ref 24–34)
MCHC RBC AUTO-ENTMCNC: 33.8 G/DL (ref 31–37)
MCV RBC AUTO: 99.7 FL (ref 78–100)
MONOCYTES # BLD: 0.9 K/UL (ref 0.05–1.2)
MONOCYTES NFR BLD: 10 % (ref 3–10)
NEUTS SEG # BLD: 5.4 K/UL (ref 1.8–8)
NEUTS SEG NFR BLD: 60 % (ref 40–73)
NRBC # BLD: 0 K/UL (ref 0–0.01)
NRBC BLD-RTO: 0 PER 100 WBC
PHOSPHATE SERPL-MCNC: 4.2 MG/DL (ref 2.5–4.9)
PLATELET # BLD AUTO: 307 K/UL (ref 135–420)
PMV BLD AUTO: 10.2 FL (ref 9.2–11.8)
POTASSIUM SERPL-SCNC: 3.9 MMOL/L (ref 3.5–5.5)
PROT SERPL-MCNC: 6.2 G/DL (ref 6.4–8.2)
RBC # BLD AUTO: 3.68 M/UL (ref 4.35–5.65)
SODIUM SERPL-SCNC: 141 MMOL/L (ref 136–145)
WBC # BLD AUTO: 9 K/UL (ref 4.6–13.2)

## 2024-10-10 PROCEDURE — 84100 ASSAY OF PHOSPHORUS: CPT

## 2024-10-10 PROCEDURE — 2580000003 HC RX 258: Performed by: HOSPITALIST

## 2024-10-10 PROCEDURE — 1100000003 HC PRIVATE W/ TELEMETRY

## 2024-10-10 PROCEDURE — 83735 ASSAY OF MAGNESIUM: CPT

## 2024-10-10 PROCEDURE — 6370000000 HC RX 637 (ALT 250 FOR IP): Performed by: HOSPITALIST

## 2024-10-10 PROCEDURE — 80053 COMPREHEN METABOLIC PANEL: CPT

## 2024-10-10 PROCEDURE — 85025 COMPLETE CBC W/AUTO DIFF WBC: CPT

## 2024-10-10 PROCEDURE — 6370000000 HC RX 637 (ALT 250 FOR IP): Performed by: INTERNAL MEDICINE

## 2024-10-10 RX ORDER — DILTIAZEM HYDROCHLORIDE 120 MG/1
120 CAPSULE, COATED, EXTENDED RELEASE ORAL DAILY
Status: DISCONTINUED | OUTPATIENT
Start: 2024-10-10 | End: 2024-10-11 | Stop reason: HOSPADM

## 2024-10-10 RX ORDER — HYDROXYZINE HYDROCHLORIDE 25 MG/1
25 TABLET, FILM COATED ORAL 3 TIMES DAILY PRN
COMMUNITY

## 2024-10-10 RX ADMIN — CLONIDINE HYDROCHLORIDE 0.2 MG: 0.1 TABLET ORAL at 20:01

## 2024-10-10 RX ADMIN — LEVOTHYROXINE SODIUM 50 MCG: 0.05 TABLET ORAL at 08:56

## 2024-10-10 RX ADMIN — SODIUM CHLORIDE, PRESERVATIVE FREE 10 ML: 5 INJECTION INTRAVENOUS at 08:59

## 2024-10-10 RX ADMIN — PANTOPRAZOLE SODIUM 40 MG: 40 TABLET, DELAYED RELEASE ORAL at 08:56

## 2024-10-10 RX ADMIN — SODIUM CHLORIDE, PRESERVATIVE FREE 10 ML: 5 INJECTION INTRAVENOUS at 20:03

## 2024-10-10 RX ADMIN — Medication 10 MG: at 20:01

## 2024-10-10 RX ADMIN — DILTIAZEM HYDROCHLORIDE 120 MG: 120 CAPSULE, EXTENDED RELEASE ORAL at 17:20

## 2024-10-10 NOTE — CONSULTS
[facility]  Pulmonary, Critical Care, and Sleep Medicine    Name: Chris Flores MRN: [id number]   : 1995 Hospital: [facility]    Date: 10/10/2024  Room: [address] [place] [address] Note                                              Consult requesting physician: Dr. Keys  Reason for Consult: A Fib/Aflutter with RVR    IMPRESSION:       Active Hospital Problems    Diagnosis Date Noted    Atrial fibrillation with RVR (HCC) [I48.91] 10/09/2024    Atrial flutter (HCC) [I48.92] 10/09/2024    Down syndrome [Q90.9] 2024    Hx of atrial septal defect [Z87.74] 2024    Sleep apnea [G47.30] 2024    Hypothyroidism [E03.9] 2024    BMI 45.0-49.9, adult [Z68.42] 2024     Code status: Full Code      RECOMMENDATIONS:     Respiratory: Compensated respiratory status.  On room air. No acute issues. Protecting airway.   Chest x-ray 10/9/2024: No pneumonia, mild blunting of right costophrenic angle could be due to small pleural effusion or pleural thickening.  CTA chest 10/9/2024: No PE, small right pleural effusion.  No lung infiltrates.  No clinical or radiological evidence for pneumonia  Keep SPO2 >=92%. HOB 30 degree elevation all the time. Aggressive pulmonary toileting. Aspiration precautions, pulmonary hygiene care.    CVS: Hx of ASD and VSD repair during infancy age  Presented with A-fib RVR  BP stable.  Monitor Hemodynamics  Actively titrate Cardizem; aim heart rate < 100 bpm  On clonidine  Multiple normal troponin  ECHO 2024: LVEF 60 to 65%, RVSP 29 mmHg  Cardiology following    ID: No fever, no leukocytosis  No clinical or radiological evidence for pneumonia  Recent COVID-19 infection  Influenza A and B, SARS-CoV-2 10/9/2024: Not detected  Blood culture 10/9/2024: NGTD  Urine culture 10/9/2024: In process  Monitor off of antibiotics  Follow cultures.     Hematology/Oncology: Monitor CBC  No evidence of active bleeding  Normal platelets    Renal: Monitor  INDICATION: Tachycardia, cough, hypoxia. Evaluate for pulmonary embolism, []  COMPARISON: Chest radiograph October 9, 2024 TECHNIQUE: Helical thin section chest CT following uneventful intravenous administration of nonionic contrast 100 mL of isovue 370 according to departmental PE protocol. Coronal and sagittal reformats were performed. Coronal and Sagittal MIP images were generated.  CT dose reduction was achieved through the use of a standardized protocol tailored for this examination and automatic exposure control for dose modulation. FINDINGS: This is a good quality study for the evaluation of pulmonary embolism to the first subsegmental arterial level. There is no pulmonary embolism to this level. The main pulmonary artery is normal in caliber. MEDIASTINUM: Mildly prominent mediastinal lymph nodes are noted, likely reactive. GEOFFREY: No mass or lymphadenopathy. THORACIC AORTA: No aneurysm. HEART: Normal in size. ESOPHAGUS: No wall thickening or dilatation. TRACHEA/BRONCHI: Patent. PLEURA: There is a small right-sided pleural effusion. LUNGS: Evaluation of the lungs is slightly limited by the low depth of inspiration and by motion artifact. There is mild atelectasis. No focal infiltrate is seen. UPPER ABDOMEN: Partially imaged. No acute pathology. BONES: No aggressive bone lesion or fracture.     1. No pulmonary arterial thromboembolism. 2. Small right-sided pleural effusion. No lung infiltrate. Electronically signed by TANISHA POWERS    XR CHEST PORTABLE    Result Date: 10/9/2024  EXAM:  XR CHEST PORTABLE INDICATION: cough, tachy, fever, c/f PNA COMPARISON: 4/3/2024 TECHNIQUE: 1239 hours portable chest AP view FINDINGS: There is mild cardiomegaly. The lungs demonstrate no pneumonia. Previously seen infiltrate at the right base has resolved. There is mild blunting the right costophrenic angle suggesting pleural thickening or small pleural effusion.     1. No pneumonia 2. Mild blunting of the right costophrenic angle

## 2024-10-10 NOTE — PROGRESS NOTES
0900: Echo tech was trying to complete echocardiogram. Pt became agitated and hitting the bed yelling. Echo team stated they will  try again later.  aware. Will also notify cardiologist. Pt also refused lovenox  shot mother at bedside stated that he had a hard time with being poked on his last admission. Asked about SCD's. Pt also no longer takes Abilify according to mother.

## 2024-10-10 NOTE — PROGRESS NOTES
Cardiology Progress Note        Patient: Chris Flores        Sex: male          DOA: 10/9/2024  YOB: 1995      Age:  28 y.o.        LOS:  LOS: 1 day   Assessment/Plan     Principal Problem:    Atrial fibrillation with RVR (HCC)  Active Problems:    Down syndrome    Hx of atrial septal defect    Sleep apnea    Hypothyroidism    BMI 45.0-49.9, adult    Atrial flutter (HCC)  Resolved Problems:    * No resolved hospital problems. *      Plan:    A-fib with RVR improving  DC IV Cardizem  Start p.o. Cardizem CD1 20 mg daily  Echocardiogram is pending  Discussed with patient and with his father                      Subjective:    cc:  Atrial fibrillation with RVR        REVIEW OF SYSTEMS:     General: No fevers or chills.  Cardiovascular: No chest pain or pressure. No palpitations. No ankle swelling  Pulmonary: No SOB, orthopnea, PND  Gastrointestinal: No nausea, vomiting or diarrhea      Objective:      Visit Vitals  /64   Pulse 83   Temp 98.1 °F (36.7 °C) (Oral)   Resp 17   Ht 1.575 m (5' 2\")   Wt 111.6 kg (246 lb)   SpO2 92%   BMI 44.99 kg/m²     Body mass index is 44.99 kg/m².    Physical Exam:  General Appearance: Comfortable, not using accessory muscles of respiration.  NECK: No JVD, no thyroidomeglay.   LUNGS:  bilateral air entry positive   HEART: S1 irregular, variable +S2 audible,    ABD: Non-tender, BS Audible    EXT: No edema, and no cysnosis.  VASCULAR EXAM: Pulses are intact.    PSYCHIATRIC EXAM: Mood is appropriate.    Medication:  Current Facility-Administered Medications   Medication Dose Route Frequency    sodium phosphate 15 mmol in sodium chloride 0.9 % 250 mL IVPB  15 mmol IntraVENous PRN    dilTIAZem 125 mg in sodium chloride 0.9 % 125 mL infusion  2.5 mg/hr IntraVENous Continuous    levothyroxine (SYNTHROID) tablet 50 mcg  50 mcg Oral QAM    sodium chloride flush 0.9 % injection 5-40 mL  5-40 mL IntraVENous 2 times per day    sodium chloride flush 0.9 % injection 5-40 mL  5-40  04/04/2024 11:24 PM (Final)    Interpretation Summary    Left Ventricle: Normal left ventricular systolic function with a visually estimated EF of 60 - 65%. Left ventricle size is normal. Normal wall thickness. Normal wall motion. Normal diastolic function.    Aortic Valve: Trileaflet valve.    Tricuspid Valve: The estimated RVSP is 29 mmHg. The estimated PASP is 29 mmHg.    Pulmonic Valve: Mild regurgitation.    Image quality is technically difficult. Technically difficult study due to patient's body habitus, limited study due to patient's ability to tolerate test and procedure performed with the patient in a supine position.    Signed by: Rolando Davalos MD on 4/4/2024 11:24 PM    No results found for this or any previous visit.                Signed By: ROLANDO DAVALOS MD     October 10, 2024

## 2024-10-10 NOTE — CARE COORDINATION
10/10/24 1502   Discharge Planning   Living Arrangements Parent   Support Systems Parent   Current DME Prior to Arrival Other (Comment)  (NA)   Potential Assistance Needed N/A   Potential Assistance Purchasing Medications No   Potential DME Needed Other (Comment)  (NA)   Type of Home Care Services None   Patient expects to be discharged to: House         SHIN met with pt and pts mother Monica at bedside, introduced self and explained role. Pt presents with Down Syndrome and parents are pts legal guardians.      Legal Guardians    Monica Flores  Relationship: Parent   Same household   [phone]  FloresChris  Relationship: Parent   Same household   [phone]        Pts mother reported that the pt resides with herself and her  Chris in a 2 level home with 3 steps to the entrance. Pts mother reported pt is independent with all ADLs however, just needs assistance with  his shoe laces and grooming. Pts mother reported that she or pts father will transport the pt home.    Pt has no home care services in place and parents provide all care and transport to appointments. Pts PCP is Poornima Rodriguez whom pt visited yesterday. SHIN informed CMS of PCP and requested a follow up visit.     CM team to follow.    Jesus Moyer, ELLEN  Case Management Department

## 2024-10-10 NOTE — PROGRESS NOTES
Hospitalist Progress Note    Patient: Chris Flores MRN: [id number]  CSN: [id number]    YOB: 1995  Age: 28 y.o.  Sex: male    DOA: 10/9/2024 LOS:  LOS: 1 day          Chief Complain :  Atrial Fibrillation   28 y.o. male with Down syndrome, cardiac surgery as infant, is sent to ER from PCP office for tachycardia and A-fib.  Subjective:   Patient sitting on bed, not happy that he was not let to go to bathroom.    Assessment/Plan     Active Hospital Problems    Diagnosis     Atrial fibrillation with RVR (HCC) [I48.91]     Atrial flutter (HCC) [I48.92]     Down syndrome [Q90.9]     Hx of atrial septal defect [Z87.74]     Sleep apnea [G47.30]     Hypothyroidism [E03.9]     BMI 45.0-49.9, adult [Z68.42]           Atrial flutter  on diltiazem drip, wean as tolerated.  Now only on 2.5/hr  Check echo  Troponin in normal range  TSH in normal range    Hypothyroidism,  continue Synthroid  TSH in normal range    Sleep apnea    BMI of 44      DVT Prophylaxis:  [x]Lovenox  []Hep SQ  []SCDs  []Coumadin, Eliquis, Xarelto, Pradaxa   []On Heparin gtt.    Case discussed with:  []Patient  [x]Family  [x]Nursing  []Case Management [x] Consultants          Discharge to;  [x] Home  [] Home Health  [] SNF/Rehab  [] LTAC. In 1-2 days    Review of systems : As above and,  General: No fevers or chills.  Cardiovascular: No chest pain or pressure. No palpitations.   Pulmonary: No shortness of breath.   Gastrointestinal: No nausea, vomiting.     Physical Exam: As above and,  General: Awake, cooperative, no acute distress    HEENT: NC, Atraumatic.  PERRLA, anicteric sclerae.  Lungs: CTA Bilaterally. No Wheezing/Rhonchi/Rales.  Heart:  S1 S2,  No

## 2024-10-11 ENCOUNTER — APPOINTMENT (OUTPATIENT)
Facility: HOSPITAL | Age: 29
End: 2024-10-11
Attending: HOSPITALIST
Payer: MEDICAID

## 2024-10-11 VITALS
TEMPERATURE: 97.8 F | WEIGHT: 246 LBS | OXYGEN SATURATION: 97 % | HEIGHT: 62 IN | BODY MASS INDEX: 45.27 KG/M2 | SYSTOLIC BLOOD PRESSURE: 102 MMHG | DIASTOLIC BLOOD PRESSURE: 48 MMHG | RESPIRATION RATE: 16 BRPM | HEART RATE: 81 BPM

## 2024-10-11 LAB
ALBUMIN SERPL-MCNC: 2.8 G/DL (ref 3.4–5)
ALBUMIN/GLOB SERPL: 0.7 (ref 0.8–1.7)
ALP SERPL-CCNC: 122 U/L (ref 45–117)
ALT SERPL-CCNC: 65 U/L (ref 16–61)
ANION GAP SERPL CALC-SCNC: 6 MMOL/L (ref 3–18)
AST SERPL-CCNC: 27 U/L (ref 10–38)
BACTERIA SPEC CULT: NORMAL
BASOPHILS # BLD: 0.1 K/UL (ref 0–0.1)
BASOPHILS NFR BLD: 1 % (ref 0–2)
BILIRUB SERPL-MCNC: 0.3 MG/DL (ref 0.2–1)
BUN SERPL-MCNC: 14 MG/DL (ref 7–18)
BUN/CREAT SERPL: 14 (ref 12–20)
CALCIUM SERPL-MCNC: 8.9 MG/DL (ref 8.5–10.1)
CHLORIDE SERPL-SCNC: 106 MMOL/L (ref 100–111)
CO2 SERPL-SCNC: 28 MMOL/L (ref 21–32)
CREAT SERPL-MCNC: 1.01 MG/DL (ref 0.6–1.3)
DIFFERENTIAL METHOD BLD: ABNORMAL
ECHO AO ASC DIAM: 3 CM
ECHO AO ASCENDING AORTA INDEX: 1.44 CM/M2
ECHO AO ROOT DIAM: 2.9 CM
ECHO AO ROOT INDEX: 1.39 CM/M2
ECHO AV AREA PEAK VELOCITY: 2.4 CM2
ECHO AV AREA VTI: 2.3 CM2
ECHO AV AREA/BSA PEAK VELOCITY: 1.1 CM2/M2
ECHO AV AREA/BSA VTI: 1.1 CM2/M2
ECHO AV MEAN GRADIENT: 5 MMHG
ECHO AV MEAN VELOCITY: 1 M/S
ECHO AV PEAK GRADIENT: 9 MMHG
ECHO AV PEAK VELOCITY: 1.5 M/S
ECHO AV VELOCITY RATIO: 0.73
ECHO AV VTI: 22.5 CM
ECHO BSA: 2.21 M2
ECHO EST RA PRESSURE: 3 MMHG
ECHO LA DIAMETER INDEX: 1.87 CM/M2
ECHO LA DIAMETER: 3.9 CM
ECHO LA TO AORTIC ROOT RATIO: 1.34
ECHO LA VOL A-L A2C: 35 ML (ref 18–58)
ECHO LA VOL A-L A4C: 47 ML (ref 18–58)
ECHO LA VOL BP: 41 ML (ref 18–58)
ECHO LA VOL MOD A2C: 33 ML (ref 18–58)
ECHO LA VOL MOD A4C: 45 ML (ref 18–58)
ECHO LA VOL/BSA BIPLANE: 20 ML/M2 (ref 16–34)
ECHO LA VOLUME AREA LENGTH: 44 ML
ECHO LA VOLUME INDEX A-L A2C: 17 ML/M2 (ref 16–34)
ECHO LA VOLUME INDEX A-L A4C: 22 ML/M2 (ref 16–34)
ECHO LA VOLUME INDEX AREA LENGTH: 21 ML/M2 (ref 16–34)
ECHO LA VOLUME INDEX MOD A2C: 16 ML/M2 (ref 16–34)
ECHO LA VOLUME INDEX MOD A4C: 22 ML/M2 (ref 16–34)
ECHO LV E' LATERAL VELOCITY: 15.6 CM/S
ECHO LV E' SEPTAL VELOCITY: 14 CM/S
ECHO LV EDV A4C: 101 ML
ECHO LV EDV INDEX A4C: 48 ML/M2
ECHO LV EF PHYSICIAN: 60 %
ECHO LV EJECTION FRACTION A4C: 60 %
ECHO LV ESV A4C: 40 ML
ECHO LV ESV INDEX A4C: 19 ML/M2
ECHO LV FRACTIONAL SHORTENING: 33 % (ref 28–44)
ECHO LV INTERNAL DIMENSION DIASTOLE INDEX: 2.2 CM/M2
ECHO LV INTERNAL DIMENSION DIASTOLIC: 4.6 CM (ref 4.2–5.9)
ECHO LV INTERNAL DIMENSION SYSTOLIC INDEX: 1.48 CM/M2
ECHO LV INTERNAL DIMENSION SYSTOLIC: 3.1 CM
ECHO LV IVSD: 1.1 CM (ref 0.6–1)
ECHO LV MASS 2D: 158.8 G (ref 88–224)
ECHO LV MASS INDEX 2D: 76 G/M2 (ref 49–115)
ECHO LV POSTERIOR WALL DIASTOLIC: 0.9 CM (ref 0.6–1)
ECHO LV RELATIVE WALL THICKNESS RATIO: 0.39
ECHO LVOT AREA: 3.1 CM2
ECHO LVOT AV VTI INDEX: 0.73
ECHO LVOT DIAM: 2 CM
ECHO LVOT MEAN GRADIENT: 3 MMHG
ECHO LVOT PEAK GRADIENT: 5 MMHG
ECHO LVOT PEAK VELOCITY: 1.1 M/S
ECHO LVOT STROKE VOLUME INDEX: 24.6 ML/M2
ECHO LVOT SV: 51.5 ML
ECHO LVOT VTI: 16.4 CM
ECHO MV A VELOCITY: 0.54 M/S
ECHO MV E DECELERATION TIME (DT): 282.6 MS
ECHO MV E VELOCITY: 1.06 M/S
ECHO MV E/A RATIO: 1.96
ECHO MV E/E' LATERAL: 6.79
ECHO MV E/E' RATIO (AVERAGED): 7.18
ECHO MV E/E' SEPTAL: 7.57
ECHO PULMONARY ARTERY SYSTOLIC PRESSURE (PASP): 36 MMHG
ECHO RA END SYSTOLIC VOLUME APICAL 4 CHAMBER INDEX BSA: 41 ML/M2
ECHO RA VOLUME: 86 ML
ECHO RIGHT VENTRICULAR SYSTOLIC PRESSURE (RVSP): 36 MMHG
ECHO RV FREE WALL PEAK S': 11.7 CM/S
ECHO RV INTERNAL DIMENSION: 5.2 CM
ECHO RV TAPSE: 2 CM (ref 1.7–?)
ECHO RVOT MEAN GRADIENT: 2 MMHG
ECHO RVOT PEAK GRADIENT: 3 MMHG
ECHO RVOT PEAK VELOCITY: 0.9 M/S
ECHO RVOT VTI: 15.5 CM
ECHO TV REGURGITANT MAX VELOCITY: 2.89 M/S
ECHO TV REGURGITANT PEAK GRADIENT: 33 MMHG
EOSINOPHIL # BLD: 0.3 K/UL (ref 0–0.4)
EOSINOPHIL NFR BLD: 3 % (ref 0–5)
ERYTHROCYTE [DISTWIDTH] IN BLOOD BY AUTOMATED COUNT: 14.6 % (ref 11.6–14.5)
GLOBULIN SER CALC-MCNC: 4.3 G/DL (ref 2–4)
GLUCOSE SERPL-MCNC: 84 MG/DL (ref 74–99)
HCT VFR BLD AUTO: 40.4 % (ref 36–48)
HGB BLD-MCNC: 13.7 G/DL (ref 13–16)
IMM GRANULOCYTES # BLD AUTO: 0.3 K/UL (ref 0–0.04)
IMM GRANULOCYTES NFR BLD AUTO: 4 % (ref 0–0.5)
LYMPHOCYTES # BLD: 1.9 K/UL (ref 0.9–3.6)
LYMPHOCYTES NFR BLD: 21 % (ref 21–52)
MCH RBC QN AUTO: 33.6 PG (ref 24–34)
MCHC RBC AUTO-ENTMCNC: 33.9 G/DL (ref 31–37)
MCV RBC AUTO: 99 FL (ref 78–100)
MONOCYTES # BLD: 0.7 K/UL (ref 0.05–1.2)
MONOCYTES NFR BLD: 8 % (ref 3–10)
NEUTS SEG # BLD: 5.8 K/UL (ref 1.8–8)
NEUTS SEG NFR BLD: 64 % (ref 40–73)
NRBC # BLD: 0 K/UL (ref 0–0.01)
NRBC BLD-RTO: 0 PER 100 WBC
PLATELET # BLD AUTO: 379 K/UL (ref 135–420)
PMV BLD AUTO: 9.9 FL (ref 9.2–11.8)
POTASSIUM SERPL-SCNC: 4 MMOL/L (ref 3.5–5.5)
PROT SERPL-MCNC: 7.1 G/DL (ref 6.4–8.2)
RBC # BLD AUTO: 4.08 M/UL (ref 4.35–5.65)
SERVICE CMNT-IMP: NORMAL
SODIUM SERPL-SCNC: 140 MMOL/L (ref 136–145)
WBC # BLD AUTO: 9.1 K/UL (ref 4.6–13.2)

## 2024-10-11 PROCEDURE — 36415 COLL VENOUS BLD VENIPUNCTURE: CPT

## 2024-10-11 PROCEDURE — 80053 COMPREHEN METABOLIC PANEL: CPT

## 2024-10-11 PROCEDURE — 85025 COMPLETE CBC W/AUTO DIFF WBC: CPT

## 2024-10-11 PROCEDURE — 93306 TTE W/DOPPLER COMPLETE: CPT

## 2024-10-11 PROCEDURE — 6370000000 HC RX 637 (ALT 250 FOR IP): Performed by: INTERNAL MEDICINE

## 2024-10-11 PROCEDURE — 2580000003 HC RX 258: Performed by: HOSPITALIST

## 2024-10-11 PROCEDURE — 6370000000 HC RX 637 (ALT 250 FOR IP): Performed by: HOSPITALIST

## 2024-10-11 RX ADMIN — SODIUM CHLORIDE, PRESERVATIVE FREE 10 ML: 5 INJECTION INTRAVENOUS at 08:38

## 2024-10-11 RX ADMIN — LEVOTHYROXINE SODIUM 50 MCG: 0.05 TABLET ORAL at 08:33

## 2024-10-11 RX ADMIN — DILTIAZEM HYDROCHLORIDE 120 MG: 120 CAPSULE, EXTENDED RELEASE ORAL at 08:33

## 2024-10-11 RX ADMIN — PANTOPRAZOLE SODIUM 40 MG: 40 TABLET, DELAYED RELEASE ORAL at 08:33

## 2024-10-11 NOTE — PLAN OF CARE
Problem: Discharge Planning  Goal: Discharge to home or other facility with appropriate resources  10/11/2024 1405 by Ida Wang, RN  Outcome: Progressing  10/11/2024 1405 by Ida Wang, RN  Outcome: Progressing  Flowsheets (Taken 10/11/2024 3077)  Discharge to home or other facility with appropriate resources:   Identify barriers to discharge with patient and caregiver   Arrange for needed discharge resources and transportation as appropriate   Identify discharge learning needs (meds, wound care, etc)     Problem: Safety - Adult  Goal: Free from fall injury  10/11/2024 1405 by Ida Wang, RN  Outcome: Progressing  10/11/2024 1405 by Ida Wang, RN  Outcome: Progressing     Problem: Cardiovascular - Adult  Goal: Maintains optimal cardiac output and hemodynamic stability  Outcome: Progressing

## 2024-10-11 NOTE — PROGRESS NOTES
Cardiology Progress Note        Patient: Chris Flores        Sex: male          DOA: 10/9/2024  YOB: 1995      Age:  28 y.o.        LOS:  LOS: 2 days   Assessment/Plan     Principal Problem:    Atrial fibrillation with RVR (HCC)  Active Problems:    Down syndrome    Hx of atrial septal defect    Sleep apnea    Hypothyroidism    BMI 45.0-49.9, adult    Atrial flutter (HCC)  Resolved Problems:    * No resolved hospital problems. *      Plan:  10/11/2024  A-fib rate controlled on Cardizem  mg daily with intermittent sinus rhythm  Patient is asymptomatic  EF is normal  Patient can be discharged from cardiac standpoint follow-up in cardiology in 1 month.  Discussed with patient and with his mother  Discussed with Dr. Keys        A-fib with RVR improving  DC IV Cardizem  Start p.o. Cardizem CD1 20 mg daily  Echocardiogram is pending  Discussed with patient and with his father                      Subjective:    cc:  Atrial fibrillation with RVR        REVIEW OF SYSTEMS:     General: No fevers or chills.  Cardiovascular: No chest pain or pressure. No palpitations. No ankle swelling  Pulmonary: No SOB, orthopnea, PND  Gastrointestinal: No nausea, vomiting or diarrhea      Objective:      Visit Vitals  /65   Pulse 87   Temp 97.8 °F (36.6 °C) (Oral)   Resp 18   Ht 1.575 m (5' 2\")   Wt 111.6 kg (246 lb)   SpO2 96%   BMI 44.99 kg/m²     Body mass index is 44.99 kg/m².    Physical Exam:  General Appearance: Comfortable, not using accessory muscles of respiration.  NECK: No JVD, no thyroidomeglay.   LUNGS:  bilateral air entry positive   HEART: S1 irregular, variable +S2 audible,    ABD: Non-tender, BS Audible    EXT: No edema, and no cysnosis.  VASCULAR EXAM: Pulses are intact.    PSYCHIATRIC EXAM: Mood is appropriate.    Medication:  Current Facility-Administered Medications   Medication Dose Route Frequency    sodium phosphate 15 mmol in sodium chloride 0.9 % 250 mL IVPB  15 mmol IntraVENous PRN  pleural effusion. No lung infiltrate. Electronically signed by TANISHA POWERS    XR CHEST PORTABLE    Result Date: 10/9/2024  1. No pneumonia 2. Mild blunting of the right costophrenic angle could be due to small pleural effusion or pleural thickening. Electronically signed by WILLY MCGEE          Cardiology Procedures:   No results found for this or any previous visit. 04/03/24    ECHO (TTE) COMPLETE (PRN CONTRAST/BUBBLE/STRAIN/3D) 04/04/2024 11:24 PM (Final)    Interpretation Summary    Left Ventricle: Normal left ventricular systolic function with a visually estimated EF of 60 - 65%. Left ventricle size is normal. Normal wall thickness. Normal wall motion. Normal diastolic function.    Aortic Valve: Trileaflet valve.    Tricuspid Valve: The estimated RVSP is 29 mmHg. The estimated PASP is 29 mmHg.    Pulmonic Valve: Mild regurgitation.    Image quality is technically difficult. Technically difficult study due to patient's body habitus, limited study due to patient's ability to tolerate test and procedure performed with the patient in a supine position.    Signed by: Italia Davalos MD on 4/4/2024 11:24 PM    No results found for this or any previous visit.                Signed By: ITALIA DAVALOS MD     October 11, 2024

## 2024-10-11 NOTE — PLAN OF CARE
Problem: Discharge Planning  Goal: Discharge to home or other facility with appropriate resources  Outcome: Progressing  Flowsheets (Taken 10/10/2024 2000)  Discharge to home or other facility with appropriate resources:   Identify barriers to discharge with patient and caregiver   Arrange for needed discharge resources and transportation as appropriate   Identify discharge learning needs (meds, wound care, etc)     Problem: Safety - Adult  Goal: Free from fall injury  Outcome: Progressing  Flowsheets (Taken 10/10/2024 2000)  Free From Fall Injury:   Instruct family/caregiver on patient safety   Based on caregiver fall risk screen, instruct family/caregiver to ask for assistance with transferring infant if caregiver noted to have fall risk factors

## 2024-10-11 NOTE — PROGRESS NOTES
Patient transferred to floor after cleared by cardiology.  No active pulmonary issues.  On room air.  Will sign off.  Please call as needed.    Eloy Griffith MD

## 2024-10-11 NOTE — DISCHARGE SUMMARY
Hospitalist Discharge Summary    Patient: Chris Flores MRN: [id number]  CSN: [id number]    YOB: 1995  Age: 28 y.o.  Sex: male    DOA: 10/9/2024 LOS:  LOS: 2 days   Discharge Date:      Primary Care Provider:  Poornima Patel MD    Admission Diagnoses: Atrial fibrillation with RVR (HCC) [I48.91]  Atrial flutter (HCC) [I48.92]    Discharge Diagnoses:    Active Hospital Problems    Diagnosis     Atrial fibrillation with RVR (HCC) [I48.91]     Atrial flutter (HCC) [I48.92]     Down syndrome [Q90.9]     Hx of atrial septal defect [Z87.74]     Sleep apnea [G47.30]     Hypothyroidism [E03.9]     BMI 45.0-49.9, adult [Z68.42]        Discharge Medications:        Medication List        START taking these medications      dilTIAZem 120 MG extended release capsule  Commonly known as: CARDIZEM CD  Take 1 capsule by mouth daily            CONTINUE taking these medications      cetirizine 10 MG tablet  Commonly known as: ZYRTEC     cloNIDine 0.1 MG tablet  Commonly known as: CATAPRES     Compact Space Chamber Catherine  Please provide one adult spacer to be used with HFA     famotidine 10 MG tablet  Commonly known as: PEPCID     hydrOXYzine HCl 25 MG tablet  Commonly known as: ATARAX     Synthroid 50 MCG tablet  Generic drug: levothyroxine            STOP taking these medications      albuterol sulfate  (90 Base) MCG/ACT inhaler  Commonly known as: PROVENTIL;VENTOLIN;PROAIR     ARIPiprazole 2 MG tablet  Commonly known as: ABILIFY     methylPREDNISolone 4 MG tablet  Commonly known as: MEDROL DOSEPACK               Where to Get Your Medications        These medications were sent to [id number] [place], [place] - [address] - P [phone] - F [phone] [address] [address]      Phone: [phone]  thickening or dilatation. TRACHEA/BRONCHI: Patent. PLEURA: There is a small right-sided pleural effusion. LUNGS: Evaluation of the lungs is slightly limited by the low depth of inspiration and by motion artifact. There is mild atelectasis. No focal infiltrate is seen. UPPER ABDOMEN: Partially imaged. No acute pathology. BONES: No aggressive bone lesion or fracture.     1. No pulmonary arterial thromboembolism. 2. Small right-sided pleural effusion. No lung infiltrate. Electronically signed by TANISHA POWERS    XR CHEST PORTABLE    Result Date: 10/9/2024  EXAM:  XR CHEST PORTABLE INDICATION: cough, tachy, fever, c/f PNA COMPARISON: 4/3/2024 TECHNIQUE: 1239 hours portable chest AP view FINDINGS: There is mild cardiomegaly. The lungs demonstrate no pneumonia. Previously seen infiltrate at the right base has resolved. There is mild blunting the right costophrenic angle suggesting pleural thickening or small pleural effusion.     1. No pneumonia 2. Mild blunting of the right costophrenic angle could be due to small pleural effusion or pleural thickening. Electronically signed by WILLY MCGEE        @[facility]([id number])@       Please note that this dictation was completed with [employer], the computer voice recognition software.  Quite often unanticipated grammatical, syntax, homophones, and other interpretive errors are inadvertently transcribed by the computer software.  Please disregard these errors.  Please excuse any errors that have escaped final proofreading.     Dear patient, if you are reviewing this note and have a question regarding the medical terminology, please bring it with you to your next PCP visit.  Medical notes are meant to be a communication between medical professionals.  Additionally, portion of this note were created using voice recognition function, syntax and phonetic over may have escaped proofreading.    CC: Poornima Patel MD

## 2024-10-12 LAB
EKG ATRIAL RATE: 170 BPM
EKG ATRIAL RATE: 337 BPM
EKG DIAGNOSIS: NORMAL
EKG DIAGNOSIS: NORMAL
EKG P AXIS: 31 DEGREES
EKG P AXIS: 83 DEGREES
EKG P-R INTERVAL: 72 MS
EKG Q-T INTERVAL: 300 MS
EKG Q-T INTERVAL: 306 MS
EKG QRS DURATION: 72 MS
EKG QRS DURATION: 86 MS
EKG QTC CALCULATION (BAZETT): 451 MS
EKG QTC CALCULATION (BAZETT): 504 MS
EKG R AXIS: 56 DEGREES
EKG R AXIS: 59 DEGREES
EKG T AXIS: 47 DEGREES
EKG T AXIS: 52 DEGREES
EKG VENTRICULAR RATE: 131 BPM
EKG VENTRICULAR RATE: 170 BPM
